# Patient Record
Sex: MALE | Race: WHITE | NOT HISPANIC OR LATINO | Employment: OTHER | URBAN - METROPOLITAN AREA
[De-identification: names, ages, dates, MRNs, and addresses within clinical notes are randomized per-mention and may not be internally consistent; named-entity substitution may affect disease eponyms.]

---

## 2017-01-09 ENCOUNTER — OFFICE VISIT (OUTPATIENT)
Dept: FAMILY MEDICINE CLINIC | Facility: CLINIC | Age: 47
End: 2017-01-09

## 2017-01-09 VITALS
HEIGHT: 77 IN | HEART RATE: 73 BPM | TEMPERATURE: 98.1 F | BODY MASS INDEX: 37.19 KG/M2 | SYSTOLIC BLOOD PRESSURE: 158 MMHG | WEIGHT: 315 LBS | DIASTOLIC BLOOD PRESSURE: 94 MMHG | OXYGEN SATURATION: 98 %

## 2017-01-09 DIAGNOSIS — J40 BRONCHITIS: Primary | ICD-10-CM

## 2017-01-09 DIAGNOSIS — J06.9 ACUTE URI: ICD-10-CM

## 2017-01-09 PROCEDURE — 99213 OFFICE O/P EST LOW 20 MIN: CPT | Performed by: FAMILY MEDICINE

## 2017-01-09 RX ORDER — CEPHALEXIN 500 MG/1
500 CAPSULE ORAL 4 TIMES DAILY
Qty: 28 CAPSULE | Refills: 0 | Status: SHIPPED | OUTPATIENT
Start: 2017-01-09 | End: 2017-01-16

## 2017-01-09 NOTE — PROGRESS NOTES
"  Chief Complaint   Patient presents with   • Sore Throat     C/o sore throat and pain while swallowing x 3 days.   • Nasal Congestion     C/o nasal congestion and drainage.   • Cough     C/o excessive cough with little production.       Upper Respiratory Infection: Patient complains of symptoms of a URI, possible sinusitis. Symptoms include congestion, cough and sore throat. Onset of symptoms was 4 days ago, gradually worsening since that time. He also c/o low grade fever, non productive cough, post nasal drip, sinus pressure, sore throat and tooth pain for the past 3 days .  He is drinking moderate amounts of fluids. Evaluation to date: none. Treatment to date: cough suppressants.  Got ill flying home.  Glass shards in throat    Vitals:    01/09/17 1457   BP: 158/94   Pulse: 73   Temp: 98.1 °F (36.7 °C)   SpO2: 98%   Weight: (!) 364 lb (165 kg)   Height: 77\" (195.6 cm)     Gen: mildly ill appearing, alert  Ears: Tm's bulging without redness  Nose:  Congestion  Throat:  Red without exudate, some drainage, tonsils okay  Neck: no LAD  Lung: mild rales, good air movement, regular RR  Heart: RR without murmur  Skin: no rash.      Assessment/Plan   Robert was seen today for sore throat, nasal congestion and cough.    Diagnoses and all orders for this visit:    Bronchitis  -     cephalexin (KEFLEX) 500 MG capsule; Take 1 capsule by mouth 4 (Four) Times a Day for 7 days.    Acute URI  -     cephalexin (KEFLEX) 500 MG capsule; Take 1 capsule by mouth 4 (Four) Times a Day for 7 days.             Tylenol or Advil as needed for pain, fever, muscle aches  Plenty of fluids  Hand washing discussed  Off work or school note given if needed.  Warm tea for throat.    Dr. Contreras Gonzales MD  [place], [place]  [facility]  "

## 2017-01-09 NOTE — MR AVS SNAPSHOT
"                        Robert Harvey   1/9/2017 3:00 PM   Office Visit    Provider:  Contreras Gonzales MD   Department:  [facility]   Dept Phone:  [phone]                Your Full Care Plan              Today's Medication Changes          These changes are accurate as of: 1/9/17  3:28 PM.  If you have any questions, ask your nurse or doctor.               New Medication(s)Ordered:     cephalexin 500 MG capsule   Commonly known as:  KEFLEX   Take 1 capsule by mouth 4 (Four) Times a Day for 7 days.   Started by:  Contreras Gonzales MD            Where to Get Your Medications      These medications were sent to [facility]/pharmacy #[id number] - [place], [place] - [address] [address] AT INTERSECTION OF [address] [phone] [facility] [phone]   [address] [address], [facility] [address]     Phone:  [phone]     cephalexin 500 MG capsule                  Your Updated Medication List          This list is accurate as of: 1/9/17  3:28 PM.  Always use your most recent med list.                CALCIUM 1200 PO       cephalexin 500 MG capsule   Commonly known as:  KEFLEX   Take 1 capsule by mouth 4 (Four) Times a Day for 7 days.       fish oil 1000 MG capsule capsule       fluticasone 50 MCG/ACT nasal spray   Commonly known as:  FLONASE   2 sprays into each nostril daily.       liothyronine 25 MCG tablet   Commonly known as:  CYTOMEL   TAKE 1 TABLET DAILY       SM VITAMIN D3 1000 UNITS tablet   Generic drug:  Cholecalciferol       SYNTHROID 137 MCG tablet   Generic drug:  levothyroxine   TAKE 1 TABLET DAILY AS     DIRECTED       Syringe/Needle (Disp) 25G X 1\" 3 ML misc   Commonly known as:  B-D 3CC LUER-LISETTE SYR 25GX1\"   1 Units every 14 (fourteen) days.       Testosterone Cypionate 200 MG/ML injection   Commonly known as:  DEPOTESTOTERONE CYPIONATE   Inject 1 mL into the shoulder, thigh, or buttocks Every 14 (Fourteen) Days.               You Were Diagnosed With        Codes Comments    Bronchitis " "   -  Primary ICD-10-CM: J40  ICD-9-CM: 490     Acute URI     ICD-10-CM: J06.9  ICD-9-CM: 465.9       Instructions     None    Patient Instructions History      MyChart Signup     [facility] [employer] allows you to send messages to your doctor, view your test results, renew your prescriptions, schedule appointments, and more. To sign up, go to [employer] and click on the Sign Up Now link in the New User? box. Enter your [employer] Activation Code exactly as it appears below along with the last four digits of your Social Security Number and your Date of Birth () to complete the sign-up process. If you do not sign up before the expiration date, you must request a new code.    [employer] Activation Code: [email or web address]  Expires: 2017  3:27 PM    If you have questions, you can email [email or web address] or call [phone] to talk to our [employer] staff. Remember, [employer] is NOT to be used for urgent needs. For medical emergencies, dial 911.               Other Info from Your Visit           Your Appointments     May 01, 2017  8:10 AM EDT   [facility] with [facility]   [facility] (--)    [address] [facility] [address]   [phone]            May 08, 2017  7:45 AM EDT   Follow Up with Contreras Gonzales MD   [facility] (--)    [address] [address]   [phone]           Arrive 15 minutes prior to appointment.              Allergies     Penicillins        Reason for Visit     Sore Throat C/o sore throat and pain while swallowing x 3 days.    Nasal Congestion C/o nasal congestion and drainage.    Cough C/o excessive cough with little production.      Vital Signs     Blood Pressure Pulse Temperature Height Weight Oxygen Saturation    158/94 73 98.1 °F (36.7 °C) 77\" (195.6 cm) 364 lb (165 kg) 98%    Body Mass Index Smoking Status                43.16 kg/m2 Former Smoker     "      Problems and Diagnoses Noted     Bronchitis    -  Primary    Acute upper respiratory infection

## 2017-01-13 RX ORDER — LEVOTHYROXINE SODIUM 137 MCG
TABLET ORAL
Qty: 90 TABLET | Refills: 3 | Status: SHIPPED | OUTPATIENT
Start: 2017-01-13 | End: 2017-05-08 | Stop reason: SINTOL

## 2017-01-13 RX ORDER — LIOTHYRONINE SODIUM 25 UG/1
TABLET ORAL
Qty: 90 TABLET | Refills: 3 | Status: SHIPPED | OUTPATIENT
Start: 2017-01-13 | End: 2017-05-08 | Stop reason: SINTOL

## 2017-02-21 ENCOUNTER — OFFICE VISIT (OUTPATIENT)
Dept: RETAIL CLINIC | Facility: CLINIC | Age: 47
End: 2017-02-21

## 2017-02-21 DIAGNOSIS — Z11.1 VISIT FOR TB SKIN TEST: Primary | ICD-10-CM

## 2017-02-21 PROCEDURE — 86580 TB INTRADERMAL TEST: CPT | Performed by: NURSE PRACTITIONER

## 2017-04-07 ENCOUNTER — RESULTS ENCOUNTER (OUTPATIENT)
Dept: FAMILY MEDICINE CLINIC | Facility: CLINIC | Age: 47
End: 2017-04-07

## 2017-04-07 DIAGNOSIS — D75.1 SECONDARY ERYTHROCYTOSIS: ICD-10-CM

## 2017-04-07 DIAGNOSIS — E03.9 ACQUIRED HYPOTHYROIDISM: ICD-10-CM

## 2017-04-07 DIAGNOSIS — Z51.81 MEDICATION MONITORING ENCOUNTER: ICD-10-CM

## 2017-04-07 DIAGNOSIS — E34.9 TESTOSTERONE DEFICIENCY: ICD-10-CM

## 2017-04-17 ENCOUNTER — OFFICE VISIT (OUTPATIENT)
Dept: FAMILY MEDICINE CLINIC | Facility: CLINIC | Age: 47
End: 2017-04-17

## 2017-04-17 ENCOUNTER — HOSPITAL ENCOUNTER (OUTPATIENT)
Dept: GENERAL RADIOLOGY | Facility: HOSPITAL | Age: 47
Discharge: HOME | End: 2017-04-17
Attending: FAMILY MEDICINE | Admitting: FAMILY MEDICINE

## 2017-04-17 VITALS
HEART RATE: 72 BPM | DIASTOLIC BLOOD PRESSURE: 90 MMHG | BODY MASS INDEX: 37.19 KG/M2 | TEMPERATURE: 98.4 F | SYSTOLIC BLOOD PRESSURE: 138 MMHG | HEIGHT: 77 IN | WEIGHT: 315 LBS | OXYGEN SATURATION: 98 %

## 2017-04-17 DIAGNOSIS — M72.2 PLANTAR FASCIITIS, RIGHT: ICD-10-CM

## 2017-04-17 DIAGNOSIS — M77.51 BONE SPUR OF RIGHT FOOT: Primary | ICD-10-CM

## 2017-04-17 DIAGNOSIS — M77.51 BONE SPUR OF RIGHT FOOT: ICD-10-CM

## 2017-04-17 PROCEDURE — 73630 X-RAY EXAM OF FOOT: CPT

## 2017-04-17 PROCEDURE — 99213 OFFICE O/P EST LOW 20 MIN: CPT | Performed by: FAMILY MEDICINE

## 2017-04-17 NOTE — PROGRESS NOTES
Subjective   Robert Harvey is a 46 y.o. male who is here for   Chief Complaint   Patient presents with   • Foot Injury     R heel x 1 week   .     History of Present Illness   Ankle Pain: Patient complains of right ankle pain.  Onset of the symptoms was a week ago. Inciting event: began walking program last week to lose weight. Current symptoms include ability to bear weight, but with some pain, stiffness and worsening symptoms after a period of inactivity.  Aggravating symptoms: going up and down stairs and squatting. Patient's overall course: gradually worsening. Patient has had prior ankle problems. Previous visits for this problem: none.  Evaluation to date: none.  Treatment to date: avoidance of offending activity and OTC analgesics which are somewhat effective.  A h/o of a heel bone spur and plantar fascitis        The following portions of the patient's history were reviewed and updated as appropriate: allergies, current medications, past medical history, past social history, past surgical history and problem list.    Review of Systems    Objective   Physical Exam   Constitutional: He appears well-developed and well-nourished.   Musculoskeletal:        Right ankle: Normal.        Right foot: There is tenderness and bony tenderness. There is no swelling.        Nursing note and vitals reviewed.      Assessment/Plan   Robert was seen today for foot injury.    Diagnoses and all orders for this visit:    Bone spur of right foot  -     XR Foot 3+ View Right; Future    Plantar fasciitis, right  -     XR Foot 3+ View Right; Future      Patient Instructions   advil  Frozen water bottle rolls      There are no discontinued medications.     Return for Next scheduled follow up.    Dr. Contreras Gonzales  [facility] [place], [place]

## 2017-05-02 LAB
CHOLEST SERPL-MCNC: 172 MG/DL (ref 0–200)
CONV COMMENT: ABNORMAL
ERYTHROCYTE [DISTWIDTH] IN BLOOD BY AUTOMATED COUNT: 13.6 % (ref 11.5–14.5)
HCT VFR BLD AUTO: 53.6 % (ref 42–52)
HDLC SERPL-MCNC: 32 MG/DL (ref 40–60)
HGB BLD-MCNC: 18 G/DL (ref 14–18)
LDLC SERPL CALC-MCNC: 97 MG/DL (ref 0–100)
MCH RBC QN AUTO: 30.8 PG (ref 27–31)
MCHC RBC AUTO-ENTMCNC: 33.6 G/DL (ref 31–37)
MCV RBC AUTO: 91.6 FL (ref 80–94)
PLATELET # BLD AUTO: 190 10*3/MM3 (ref 140–500)
PSA SERPL-MCNC: 0.33 NG/ML (ref 0–4)
RBC # BLD AUTO: 5.85 10*6/MM3 (ref 4.7–6.1)
T3 SERPL-MCNC: 114.5 NG/DL (ref 80–200)
T4 FREE SERPL-MCNC: 0.97 NG/DL (ref 0.93–1.7)
TESTOST FREE SERPL-MCNC: 11.8 PG/ML (ref 6.8–21.5)
TESTOST SERPL-MCNC: 285 NG/DL (ref 348–1197)
TRIGL SERPL-MCNC: 214 MG/DL (ref 0–150)
TSH SERPL DL<=0.005 MIU/L-ACNC: 1.64 MIU/ML (ref 0.27–4.2)
VLDLC SERPL CALC-MCNC: 42.8 MG/DL (ref 8–32)
WBC # BLD AUTO: 6.64 10*3/MM3 (ref 4.8–10.8)

## 2017-05-08 ENCOUNTER — OFFICE VISIT (OUTPATIENT)
Dept: FAMILY MEDICINE CLINIC | Facility: CLINIC | Age: 47
End: 2017-05-08

## 2017-05-08 VITALS
WEIGHT: 315 LBS | HEIGHT: 77 IN | OXYGEN SATURATION: 98 % | BODY MASS INDEX: 37.19 KG/M2 | DIASTOLIC BLOOD PRESSURE: 82 MMHG | HEART RATE: 73 BPM | TEMPERATURE: 98.1 F | SYSTOLIC BLOOD PRESSURE: 138 MMHG

## 2017-05-08 DIAGNOSIS — J30.1 SEASONAL ALLERGIC RHINITIS DUE TO POLLEN: ICD-10-CM

## 2017-05-08 DIAGNOSIS — Z51.81 MEDICATION MONITORING ENCOUNTER: ICD-10-CM

## 2017-05-08 DIAGNOSIS — E55.9 VITAMIN D DEFICIENCY: ICD-10-CM

## 2017-05-08 DIAGNOSIS — Z00.00 ROUTINE ADULT HEALTH MAINTENANCE: ICD-10-CM

## 2017-05-08 DIAGNOSIS — E03.9 ACQUIRED HYPOTHYROIDISM: Primary | ICD-10-CM

## 2017-05-08 DIAGNOSIS — E34.9 TESTOSTERONE DEFICIENCY: ICD-10-CM

## 2017-05-08 PROCEDURE — 99213 OFFICE O/P EST LOW 20 MIN: CPT | Performed by: FAMILY MEDICINE

## 2017-05-08 RX ORDER — FLUTICASONE PROPIONATE 50 MCG
1 SPRAY, SUSPENSION (ML) NASAL 2 TIMES DAILY PRN
Qty: 3 BOTTLE | Refills: 3 | Status: SHIPPED | OUTPATIENT
Start: 2017-05-08 | End: 2017-11-13 | Stop reason: SDUPTHER

## 2017-05-08 RX ORDER — TESTOSTERONE CYPIONATE 200 MG/ML
INJECTION, SOLUTION INTRAMUSCULAR
Qty: 10 ML | Refills: 1 | Status: SHIPPED | OUTPATIENT
Start: 2017-05-08 | End: 2017-07-08 | Stop reason: SDUPTHER

## 2017-05-13 ENCOUNTER — RESULTS ENCOUNTER (OUTPATIENT)
Dept: FAMILY MEDICINE CLINIC | Facility: CLINIC | Age: 47
End: 2017-05-13

## 2017-05-13 DIAGNOSIS — Z00.00 ROUTINE ADULT HEALTH MAINTENANCE: ICD-10-CM

## 2017-05-13 DIAGNOSIS — E55.9 VITAMIN D DEFICIENCY: ICD-10-CM

## 2017-05-13 DIAGNOSIS — Z51.81 MEDICATION MONITORING ENCOUNTER: ICD-10-CM

## 2017-05-13 DIAGNOSIS — E03.9 ACQUIRED HYPOTHYROIDISM: ICD-10-CM

## 2017-05-13 DIAGNOSIS — E34.9 TESTOSTERONE DEFICIENCY: ICD-10-CM

## 2017-07-08 DIAGNOSIS — E34.9 TESTOSTERONE DEFICIENCY: ICD-10-CM

## 2017-07-10 RX ORDER — TESTOSTERONE CYPIONATE 200 MG/ML
INJECTION, SOLUTION INTRAMUSCULAR
Qty: 10 ML | Refills: 3 | Status: SHIPPED | OUTPATIENT
Start: 2017-07-10 | End: 2017-10-04 | Stop reason: SDUPTHER

## 2017-07-10 RX ORDER — SYRINGE WITH NEEDLE, 1 ML 25GX5/8"
SYRINGE, EMPTY DISPOSABLE MISCELLANEOUS
Qty: 100 EACH | Refills: 2 | Status: SHIPPED | OUTPATIENT
Start: 2017-07-10 | End: 2019-02-23 | Stop reason: SDUPTHER

## 2017-07-10 NOTE — TELEPHONE ENCOUNTER
Last OV: 05/08/2017  Next OV: 11/13/2017  Last RF: 05/08/2017  #10 with 1 rf   Elliot done today

## 2017-10-04 DIAGNOSIS — E34.9 TESTOSTERONE DEFICIENCY: ICD-10-CM

## 2017-10-05 RX ORDER — TESTOSTERONE CYPIONATE 200 MG/ML
INJECTION, SOLUTION INTRAMUSCULAR
Qty: 10 ML | Refills: 0 | OUTPATIENT
Start: 2017-10-05 | End: 2017-11-13 | Stop reason: SDUPTHER

## 2017-10-05 NOTE — TELEPHONE ENCOUNTER
Last OV: 05/08/2017  Next OV: 11/13/2017  Last RF: 07/10/2017  # 10        3rfs  Elliot: 07/10/2017

## 2017-11-06 DIAGNOSIS — E03.9 ACQUIRED HYPOTHYROIDISM: ICD-10-CM

## 2017-11-08 LAB
25(OH)D3+25(OH)D2 SERPL-MCNC: 23.6 NG/ML
ALBUMIN SERPL-MCNC: 4.6 G/DL (ref 3.5–5.2)
ALBUMIN/GLOB SERPL: 2.3 G/DL
ALP SERPL-CCNC: 72 U/L (ref 40–129)
ALT SERPL-CCNC: 36 U/L (ref 5–41)
AST SERPL-CCNC: 27 U/L (ref 5–40)
BILIRUB SERPL-MCNC: 0.7 MG/DL (ref 0.2–1.2)
BUN SERPL-MCNC: 16 MG/DL (ref 6–20)
BUN/CREAT SERPL: 14.8 (ref 7–25)
CALCIUM SERPL-MCNC: 9.5 MG/DL (ref 8.6–10.5)
CHLORIDE SERPL-SCNC: 105 MMOL/L (ref 98–107)
CO2 SERPL-SCNC: 26.5 MMOL/L (ref 22–29)
CREAT SERPL-MCNC: 1.08 MG/DL (ref 0.76–1.27)
ERYTHROCYTE [DISTWIDTH] IN BLOOD BY AUTOMATED COUNT: 13.7 % (ref 11.5–14.5)
GFR SERPLBLD CREATININE-BSD FMLA CKD-EPI: 73 ML/MIN/1.73
GFR SERPLBLD CREATININE-BSD FMLA CKD-EPI: 88 ML/MIN/1.73
GLOBULIN SER CALC-MCNC: 2 GM/DL
GLUCOSE SERPL-MCNC: 90 MG/DL (ref 65–99)
HCT VFR BLD AUTO: 44.1 % (ref 42–52)
HGB BLD-MCNC: 15 G/DL (ref 14–18)
MCH RBC QN AUTO: 32 PG (ref 27–31)
MCHC RBC AUTO-ENTMCNC: 34 G/DL (ref 31–37)
MCV RBC AUTO: 94 FL (ref 80–94)
PLATELET # BLD AUTO: 181 10*3/MM3 (ref 140–500)
POTASSIUM SERPL-SCNC: 4.7 MMOL/L (ref 3.5–5.2)
PROT SERPL-MCNC: 6.6 G/DL (ref 6–8.5)
PSA SERPL-MCNC: 0.35 NG/ML (ref 0–4)
RBC # BLD AUTO: 4.69 10*6/MM3 (ref 4.7–6.1)
SODIUM SERPL-SCNC: 144 MMOL/L (ref 136–145)
T3 SERPL-MCNC: 175.3 NG/DL (ref 80–200)
T4 FREE SERPL-MCNC: 1.01 NG/DL (ref 0.93–1.7)
TESTOST FREE SERPL-MCNC: 8.7 PG/ML (ref 6.8–21.5)
TESTOST SERPL-MCNC: 309 NG/DL (ref 264–916)
TSH SERPL DL<=0.005 MIU/L-ACNC: 4.77 MIU/ML (ref 0.27–4.2)
WBC # BLD AUTO: 5.46 10*3/MM3 (ref 4.8–10.8)

## 2017-11-13 ENCOUNTER — OFFICE VISIT (OUTPATIENT)
Dept: FAMILY MEDICINE CLINIC | Facility: CLINIC | Age: 47
End: 2017-11-13

## 2017-11-13 VITALS
HEIGHT: 77 IN | TEMPERATURE: 98 F | SYSTOLIC BLOOD PRESSURE: 144 MMHG | OXYGEN SATURATION: 97 % | HEART RATE: 72 BPM | DIASTOLIC BLOOD PRESSURE: 90 MMHG | WEIGHT: 315 LBS | BODY MASS INDEX: 37.19 KG/M2

## 2017-11-13 DIAGNOSIS — E03.9 ACQUIRED HYPOTHYROIDISM: Primary | ICD-10-CM

## 2017-11-13 DIAGNOSIS — E34.9 TESTOSTERONE DEFICIENCY: ICD-10-CM

## 2017-11-13 DIAGNOSIS — J30.1 CHRONIC SEASONAL ALLERGIC RHINITIS DUE TO POLLEN: ICD-10-CM

## 2017-11-13 DIAGNOSIS — Z00.00 ROUTINE ADULT HEALTH MAINTENANCE: ICD-10-CM

## 2017-11-13 DIAGNOSIS — Z12.5 SCREENING FOR PROSTATE CANCER: ICD-10-CM

## 2017-11-13 PROCEDURE — 99396 PREV VISIT EST AGE 40-64: CPT | Performed by: FAMILY MEDICINE

## 2017-11-13 RX ORDER — FLUTICASONE PROPIONATE 50 MCG
1 SPRAY, SUSPENSION (ML) NASAL 2 TIMES DAILY PRN
Qty: 3 BOTTLE | Refills: 3 | Status: SHIPPED | OUTPATIENT
Start: 2017-11-13 | End: 2018-05-14 | Stop reason: SDUPTHER

## 2017-11-13 RX ORDER — TESTOSTERONE CYPIONATE 200 MG/ML
200 INJECTION, SOLUTION INTRAMUSCULAR SEE ADMIN INSTRUCTIONS
Qty: 10 ML | Refills: 1 | Status: SHIPPED | OUTPATIENT
Start: 2017-11-13 | End: 2018-02-08 | Stop reason: SDUPTHER

## 2017-11-13 NOTE — PROGRESS NOTES
"  Chief Complaint   Patient presents with   • Annual Exam   • Toe Numbness   • Hip Pain     Left, x 1 1/2 weeks        Subjective   Robert Harvey is a 47 y.o. male and is here for a yearly physical exam. The patient reports problems - allergies, weight, left hip is sore..  The bottoms of his 2-4 toes on both sides are still numb.  Rest of foot and leg are ok. Fingers ok  Do you take any herbs or supplements that were not prescribed by a doctor? yes. If so, these will be added to active medication list.    The following portions of the patient's history were reviewed and updated as appropriate: allergies, current medications, past family history, past medical history, past social history, past surgical history and problem list.    Social and Family and Surgical History reviewed and updated today, see Rooming tab.    Health History, Preventive Measures and Vaccination flow sheets reviewed and updated today.    Patient's current medical chart in Epic; including previous office notes, imaging, labs, specialist's evaluation either in notes or in Media tab reviewed today.    Other pertinent medical information also reviewed thru Care Everywhere function is also reviewed today.    Review of Systems  Review of Systems  A comprehensive review of systems was negative except for: Constitutional: positive for fatigue  Ears, nose, mouth, throat, and face: positive for nasal congestion  Musculoskeletal: positive for arthralgias  Allergic/Immunologic: positive for hay fever    Vitals:    11/13/17 0751   BP: 144/90   BP Location: Left arm   Patient Position: Sitting   Pulse: 72   Temp: 98 °F (36.7 °C)   SpO2: 97%   Weight: (!) 356 lb 12.8 oz (162 kg)   Height: 77\" (195.6 cm)       General Appearance:  Alert, cooperative, no distress, appears stated age   Head:  Normocephalic, without obvious abnormality, atraumatic   Eyes:  PERRL, conjunctiva/corneas clear, EOM's intact.   Ears:  Normal TM's and external ear canals, both ears "   Nose: Nares normal, septum midline, mucosa normal, no drainage or sinus tenderness   Throat: Lips, mucosa, and tongue normal; teeth and gums normal   Neck: Supple, symmetrical, trachea midline, no adenopathy;   thyroid: No enlargement/tenderness/nodules; no carotid  bruit   Back:  Symmetric, no curvature, ROM normal, no CVA tenderness   Lungs:  Clear to auscultation bilaterally, respirations unlabored   Chest wall:  No tenderness or deformity   Heart:  Regular rate and rhythm, S1 and S2 normal, no murmur, rub or gallop   Abdomen:  Soft, non-tender, bowel sounds active all four quadrants,   no masses, no organomegaly   Rectal:        Extremities: Extremities normal, atraumatic, no cyanosis or edema   Pulses: 2+ and symmetric all extremities   Skin: Skin color, texture, turgor normal, no rashes or lesions   Lymph nodes: Cervical, supraclavicular, and axillary nodes normal   Neurologic: CNII-XII intact. Normal strength, sensation and reflexes   throughout          Results for orders placed or performed in visit on 11/06/17   Comprehensive Metabolic Panel   Result Value Ref Range    Glucose 90 65 - 99 mg/dL    BUN 16 6 - 20 mg/dL    Creatinine 1.08 0.76 - 1.27 mg/dL    eGFR Non African Am 73 >60 mL/min/1.73    eGFR African Am 88 >60 mL/min/1.73    BUN/Creatinine Ratio 14.8 7.0 - 25.0    Sodium 144 136 - 145 mmol/L    Potassium 4.7 3.5 - 5.2 mmol/L    Chloride 105 98 - 107 mmol/L    Total CO2 26.5 22.0 - 29.0 mmol/L    Calcium 9.5 8.6 - 10.5 mg/dL    Total Protein 6.6 6.0 - 8.5 g/dL    Albumin 4.60 3.50 - 5.20 g/dL    Globulin 2.0 gm/dL    A/G Ratio 2.3 g/dL    Total Bilirubin 0.7 0.2 - 1.2 mg/dL    Alkaline Phosphatase 72 40 - 129 U/L    AST (SGOT) 27 5 - 40 U/L    ALT (SGPT) 36 5 - 41 U/L   CBC (No Diff)   Result Value Ref Range    WBC 5.46 4.80 - 10.80 10*3/mm3    RBC 4.69 (L) 4.70 - 6.10 10*6/mm3    Hemoglobin 15.0 14.0 - 18.0 g/dL    Hematocrit 44.1 42.0 - 52.0 %    MCV 94.0 80.0 - 94.0 fL    MCH 32.0 (H) 27.0 -  31.0 pg    MCHC 34.0 31.0 - 37.0 g/dL    RDW 13.7 11.5 - 14.5 %    Platelets 181 140 - 500 10*3/mm3   Testosterone, Free, Total   Result Value Ref Range    Testosterone, Total 309 264 - 916 ng/dL    Testosterone, Free 8.7 6.8 - 21.5 pg/mL   T4, Free   Result Value Ref Range    Free T4 1.01 0.93 - 1.70 ng/dL   TSH   Result Value Ref Range    TSH 4.770 (H) 0.270 - 4.200 mIU/mL   PSA   Result Value Ref Range    PSA 0.349 0.000 - 4.000 ng/mL   Vitamin D 25 Hydroxy   Result Value Ref Range    25 Hydroxy, Vitamin D 23.6 ng/mL   T3   Result Value Ref Range    T3, Total 175.3 80.0 - 200.0 ng/dL     Assessment/Plan   Healthy male exam.  Robert was seen today for annual exam, toe numbness and hip pain.    Diagnoses and all orders for this visit:    Acquired hypothyroidism  -     Thyroid 81.25 MG PO tablet; Take 1 tablet by mouth Daily.  -     Lipid Panel; Future  -     TSH; Future  -     T4, Free; Future  -     T3; Future    Routine adult health maintenance    Testosterone deficiency  -     Testosterone Cypionate (DEPOTESTOTERONE CYPIONATE) 200 MG/ML injection; Inject 1 mL into the shoulder, thigh, or buttocks See Admin Instructions.  -     Testosterone, Free, Total; Future  -     Lipid Panel; Future    Chronic seasonal allergic rhinitis due to pollen  -     fluticasone (FLONASE) 50 MCG/ACT nasal spray; 1 spray into each nostril 2 (Two) Times a Day As Needed for Rhinitis or Allergies.    Screening for prostate cancer  -     PSA; Future      1. Thyroid a little under treated on his current Nature thyroid, lets increase to next dose up  Stay on same T dose.    2. Patient Counseling:  --Nutrition: Stressed importance of moderation in sodium/caffeine intake, saturated fat and cholesterol.  Discussed caloric balance, sufficient intake of fresh fruits, vegetables, fiber, calcium, iron.lifelong struggle with weight  --Discussed the daily use of baby aspirin, if indicated.not yet  --Exercise: Stressed the importance of regular  exercise. Needs more  --Substance Abuse: Discussed cessation/primary prevention of tobacco, alcohol, or other drug use; driving or other dangerous activities under the influence. Good here   --Dental health: Discussed importance of regular tooth brushing, flossing, and dental visits.utd  -- suggested having eyes and vision checked if needed or past due.utd  --Immunizations reviewed.  --Discussed benefits of screening colonoscopy.first will be age 49  3. Discussed the patient's BMI with him.  The BMI is above average; BMI management plan is completed  4. Follow up in 6 months    There are no Patient Instructions on file for this visit.    Medications Discontinued During This Encounter   Medication Reason   • Calcium Carbonate-Vit D-Min (CALCIUM 1200 PO) Therapy completed   • Thyroid 130 MG PO tablet Dose adjustment   • fluticasone (FLONASE) 50 MCG/ACT nasal spray Reorder   • NATURE-THROID 65 MG tablet Dose adjustment   • Testosterone Cypionate (DEPOTESTOTERONE CYPIONATE) 200 MG/ML injection Reorder        Dr. Contreras Gonzales MD  [place], [place]  [facility]

## 2018-02-08 DIAGNOSIS — E34.9 TESTOSTERONE DEFICIENCY: ICD-10-CM

## 2018-02-09 RX ORDER — TESTOSTERONE CYPIONATE 200 MG/ML
INJECTION, SOLUTION INTRAMUSCULAR
Qty: 3 ML | Refills: 5 | OUTPATIENT
Start: 2018-02-09 | End: 2018-04-11 | Stop reason: SDUPTHER

## 2018-02-09 NOTE — TELEPHONE ENCOUNTER
Last OV: 11/13/2017  Next OV: 05/14/2018  Last RF: 11/13/2017  # 10        1rfs  Elliot: 07/10/2017

## 2018-02-17 DIAGNOSIS — E34.9 TESTOSTERONE DEFICIENCY: ICD-10-CM

## 2018-02-19 RX ORDER — TESTOSTERONE CYPIONATE 200 MG/ML
INJECTION, SOLUTION INTRAMUSCULAR
Qty: 10 ML | Refills: 0 | OUTPATIENT
Start: 2018-02-19

## 2018-02-21 ENCOUNTER — PRIOR AUTHORIZATION (OUTPATIENT)
Dept: FAMILY MEDICINE CLINIC | Facility: CLINIC | Age: 48
End: 2018-02-21

## 2018-04-11 DIAGNOSIS — E34.9 TESTOSTERONE DEFICIENCY: ICD-10-CM

## 2018-04-11 RX ORDER — TESTOSTERONE CYPIONATE 200 MG/ML
INJECTION, SOLUTION INTRAMUSCULAR
Qty: 10 ML | Refills: 1 | OUTPATIENT
Start: 2018-04-11 | End: 2018-05-14 | Stop reason: SDUPTHER

## 2018-04-13 ENCOUNTER — RESULTS ENCOUNTER (OUTPATIENT)
Dept: FAMILY MEDICINE CLINIC | Facility: CLINIC | Age: 48
End: 2018-04-13

## 2018-04-13 DIAGNOSIS — E03.9 ACQUIRED HYPOTHYROIDISM: ICD-10-CM

## 2018-04-13 DIAGNOSIS — E34.9 TESTOSTERONE DEFICIENCY: ICD-10-CM

## 2018-04-13 DIAGNOSIS — Z12.5 SCREENING FOR PROSTATE CANCER: ICD-10-CM

## 2018-05-04 LAB
CHOLEST SERPL-MCNC: 190 MG/DL (ref 0–200)
HDLC SERPL-MCNC: 36 MG/DL (ref 40–60)
LDLC SERPL CALC-MCNC: 123 MG/DL (ref 0–100)
PSA SERPL-MCNC: 0.28 NG/ML (ref 0–4)
T3 SERPL-MCNC: 139.4 NG/DL (ref 80–200)
T4 FREE SERPL-MCNC: 1.01 NG/DL (ref 0.93–1.7)
TESTOST FREE SERPL-MCNC: 8.4 PG/ML (ref 6.8–21.5)
TESTOST SERPL-MCNC: 172 NG/DL (ref 264–916)
TRIGL SERPL-MCNC: 153 MG/DL (ref 0–150)
TSH SERPL DL<=0.005 MIU/L-ACNC: 4.03 MIU/ML (ref 0.27–4.2)
VLDLC SERPL CALC-MCNC: 30.6 MG/DL (ref 8–32)

## 2018-05-14 ENCOUNTER — OFFICE VISIT (OUTPATIENT)
Dept: FAMILY MEDICINE CLINIC | Facility: CLINIC | Age: 48
End: 2018-05-14

## 2018-05-14 VITALS
HEIGHT: 77 IN | SYSTOLIC BLOOD PRESSURE: 134 MMHG | WEIGHT: 315 LBS | HEART RATE: 67 BPM | DIASTOLIC BLOOD PRESSURE: 80 MMHG | TEMPERATURE: 98 F | OXYGEN SATURATION: 97 % | BODY MASS INDEX: 37.19 KG/M2

## 2018-05-14 DIAGNOSIS — E55.9 VITAMIN D DEFICIENCY: ICD-10-CM

## 2018-05-14 DIAGNOSIS — Z00.00 ROUTINE ADULT HEALTH MAINTENANCE: ICD-10-CM

## 2018-05-14 DIAGNOSIS — E03.9 ACQUIRED HYPOTHYROIDISM: Primary | ICD-10-CM

## 2018-05-14 DIAGNOSIS — Z51.81 MEDICATION MONITORING ENCOUNTER: ICD-10-CM

## 2018-05-14 DIAGNOSIS — Z12.5 SCREENING FOR PROSTATE CANCER: ICD-10-CM

## 2018-05-14 DIAGNOSIS — D75.1 SECONDARY ERYTHROCYTOSIS: ICD-10-CM

## 2018-05-14 DIAGNOSIS — J30.1 CHRONIC SEASONAL ALLERGIC RHINITIS DUE TO POLLEN: ICD-10-CM

## 2018-05-14 DIAGNOSIS — E34.9 TESTOSTERONE DEFICIENCY: ICD-10-CM

## 2018-05-14 PROCEDURE — 99213 OFFICE O/P EST LOW 20 MIN: CPT | Performed by: FAMILY MEDICINE

## 2018-05-14 RX ORDER — TESTOSTERONE CYPIONATE 200 MG/ML
INJECTION, SOLUTION INTRAMUSCULAR
Qty: 3 ML | Refills: 5 | Status: SHIPPED | OUTPATIENT
Start: 2018-05-14 | End: 2018-11-26 | Stop reason: SDUPTHER

## 2018-05-14 RX ORDER — FLUTICASONE PROPIONATE 50 MCG
1 SPRAY, SUSPENSION (ML) NASAL 2 TIMES DAILY PRN
Qty: 3 BOTTLE | Refills: 3 | Status: SHIPPED | OUTPATIENT
Start: 2018-05-14 | End: 2019-05-28 | Stop reason: SDUPTHER

## 2018-05-14 NOTE — PATIENT INSTRUCTIONS
Results for orders placed or performed in visit on 04/13/18   Testosterone, Free, Total   Result Value Ref Range    Testosterone, Total 172 (L) 264 - 916 ng/dL    Testosterone, Free 8.4 6.8 - 21.5 pg/mL   PSA   Result Value Ref Range    PSA 0.284 0.000 - 4.000 ng/mL   Lipid Panel   Result Value Ref Range    Total Cholesterol 190 0 - 200 mg/dL    Triglycerides 153 (H) 0 - 150 mg/dL    HDL Cholesterol 36 (L) 40 - 60 mg/dL    VLDL Cholesterol 30.6 8 - 32 mg/dL    LDL Cholesterol  123 (H) 0 - 100 mg/dL   TSH   Result Value Ref Range    TSH 4.030 0.270 - 4.200 mIU/mL   T4, Free   Result Value Ref Range    Free T4 1.01 0.93 - 1.70 ng/dL   T3   Result Value Ref Range    T3, Total 139.4 80.0 - 200.0 ng/dl

## 2018-05-14 NOTE — PROGRESS NOTES
Subjective   Robert Harvey is a 47 y.o. male who is here for   Chief Complaint   Patient presents with   • Follow-up     Lab Results    • Hypothyroidism   .     History of Present Illness   Hypothyroidism: Patient presents for evaluation of thyroid function. Symptoms consist of denies fatigue, weight changes, heat/cold intolerance, bowel/skin changes or CVS symptoms. Symptoms have present for several years. The symptoms are mild.  The problem has been completely resolved.  Previous thyroid studies include TSH. The hypothyroidism is due to hypothyroidism.    Reviewed labs,     T is low, but he feels ok, so we will stay on same dose        The following portions of the patient's history were reviewed and updated as appropriate: allergies, current medications, past family history, past medical history, past social history and problem list.    Review of Systems    Objective   Physical Exam   Constitutional: He appears well-developed and well-nourished.   Neck: No thyromegaly present.   Cardiovascular: Normal rate.    Pulmonary/Chest: Effort normal.   Neurological: He is alert.   Psychiatric: He has a normal mood and affect.   Nursing note and vitals reviewed.      Assessment/Plan   Robert was seen today for follow-up and hypothyroidism.    Diagnoses and all orders for this visit:    Acquired hypothyroidism  -     Lipid Panel; Future  -     TSH; Future  -     T4, Free; Future    Testosterone deficiency  -     Testosterone Cypionate (DEPOTESTOTERONE CYPIONATE) 200 MG/ML injection; Inject 1 ml every 10 days  -     Testosterone, Free, Total; Future    Chronic seasonal allergic rhinitis due to pollen  -     fluticasone (FLONASE) 50 MCG/ACT nasal spray; 1 spray into each nostril 2 (Two) Times a Day As Needed for Rhinitis or Allergies.    Medication monitoring encounter    Routine adult health maintenance  -     Comprehensive Metabolic Panel; Future  -     Urinalysis With / Microscopic If Indicated - Urine, Clean Catch;  Future    Screening for prostate cancer  -     PSA Screen; Future    Secondary erythrocytosis  -     CBC (No Diff); Future    Vitamin D deficiency  -     Vitamin D 25 Hydroxy; Future    Other orders  -     NATURE-THROID 65 MG tablet; Take 1 tablet by mouth Daily.      Patient Instructions     Results for orders placed or performed in visit on 04/13/18   Testosterone, Free, Total   Result Value Ref Range    Testosterone, Total 172 (L) 264 - 916 ng/dL    Testosterone, Free 8.4 6.8 - 21.5 pg/mL   PSA   Result Value Ref Range    PSA 0.284 0.000 - 4.000 ng/mL   Lipid Panel   Result Value Ref Range    Total Cholesterol 190 0 - 200 mg/dL    Triglycerides 153 (H) 0 - 150 mg/dL    HDL Cholesterol 36 (L) 40 - 60 mg/dL    VLDL Cholesterol 30.6 8 - 32 mg/dL    LDL Cholesterol  123 (H) 0 - 100 mg/dL   TSH   Result Value Ref Range    TSH 4.030 0.270 - 4.200 mIU/mL   T4, Free   Result Value Ref Range    Free T4 1.01 0.93 - 1.70 ng/dL   T3   Result Value Ref Range    T3, Total 139.4 80.0 - 200.0 ng/dl           Medications Discontinued During This Encounter   Medication Reason   • Thyroid 81.25 MG PO tablet Dose adjustment   • Testosterone Cypionate (DEPOTESTOTERONE CYPIONATE) 200 MG/ML injection Reorder   • NATURE-THROID 65 MG tablet Reorder   • fluticasone (FLONASE) 50 MCG/ACT nasal spray Reorder        Return in about 6 months (around 11/14/2018) for Annual physical.    Dr. Contreras Gonzales  [place], [place]

## 2018-06-21 ENCOUNTER — TELEPHONE (OUTPATIENT)
Dept: FAMILY MEDICINE CLINIC | Facility: CLINIC | Age: 48
End: 2018-06-21

## 2018-06-21 DIAGNOSIS — Z11.1 VISIT FOR TB SKIN TEST: ICD-10-CM

## 2018-06-21 DIAGNOSIS — Z00.00 ROUTINE ADULT HEALTH MAINTENANCE: ICD-10-CM

## 2018-06-21 DIAGNOSIS — Z11.1 VISIT FOR TB SKIN TEST: Primary | ICD-10-CM

## 2018-06-21 NOTE — TELEPHONE ENCOUNTER
Pt is coming in tomorrow for labs. For chicken pox and TB titers. (this is what it states on the schedule)     Ok labs ordered  Contreras Gonzales MD

## 2018-06-22 DIAGNOSIS — Z00.00 ROUTINE HEALTH MAINTENANCE: ICD-10-CM

## 2018-06-28 LAB
ANNOTATION COMMENT IMP: NORMAL
GAMMA INTERFERON BACKGROUND BLD IA-ACNC: 0.05 IU/ML
M TB IFN-G BLD-IMP: NEGATIVE
M TB IFN-G CD4+ BCKGRND COR BLD-ACNC: <0 IU/ML
M TB IFN-G CD4+ T-CELLS BLD-ACNC: 0.04 IU/ML
MEV IGG SER IA-ACNC: >300 AU/ML
MITOGEN IGNF BLD-ACNC: >10 IU/ML
MUV IGG SER IA-ACNC: 46.9 AU/ML
QUANTIFERON INCUBATION: NORMAL
RUBV IGG SERPL IA-ACNC: 6.83 INDEX
SERVICE CMNT-IMP: NORMAL
VZV IGG SER IA-ACNC: 3044 INDEX
VZV IGM SER IA-ACNC: <0.91 INDEX (ref 0–0.9)

## 2018-10-14 ENCOUNTER — RESULTS ENCOUNTER (OUTPATIENT)
Dept: FAMILY MEDICINE CLINIC | Facility: CLINIC | Age: 48
End: 2018-10-14

## 2018-10-14 DIAGNOSIS — Z00.00 ROUTINE ADULT HEALTH MAINTENANCE: ICD-10-CM

## 2018-10-14 DIAGNOSIS — E03.9 ACQUIRED HYPOTHYROIDISM: ICD-10-CM

## 2018-10-14 DIAGNOSIS — E34.9 TESTOSTERONE DEFICIENCY: ICD-10-CM

## 2018-10-14 DIAGNOSIS — Z12.5 SCREENING FOR PROSTATE CANCER: ICD-10-CM

## 2018-10-14 DIAGNOSIS — D75.1 SECONDARY ERYTHROCYTOSIS: ICD-10-CM

## 2018-10-14 DIAGNOSIS — E55.9 VITAMIN D DEFICIENCY: ICD-10-CM

## 2018-11-14 LAB
25(OH)D3+25(OH)D2 SERPL-MCNC: 28.4 NG/ML
ALBUMIN SERPL-MCNC: 4.7 G/DL (ref 3.5–5.2)
ALBUMIN/GLOB SERPL: 2 G/DL
ALP SERPL-CCNC: 77 U/L (ref 40–129)
ALT SERPL-CCNC: 48 U/L (ref 5–41)
APPEARANCE UR: CLEAR
AST SERPL-CCNC: 36 U/L (ref 5–40)
BILIRUB SERPL-MCNC: 0.7 MG/DL (ref 0.2–1.2)
BILIRUB UR QL STRIP: NEGATIVE
BUN SERPL-MCNC: 19 MG/DL (ref 6–20)
BUN/CREAT SERPL: 17.6 (ref 7–25)
CALCIUM SERPL-MCNC: 9.4 MG/DL (ref 8.6–10.5)
CHLORIDE SERPL-SCNC: 102 MMOL/L (ref 98–107)
CHOLEST SERPL-MCNC: 181 MG/DL (ref 0–200)
CO2 SERPL-SCNC: 26.4 MMOL/L (ref 22–29)
COLOR UR: YELLOW
CREAT SERPL-MCNC: 1.08 MG/DL (ref 0.76–1.27)
ERYTHROCYTE [DISTWIDTH] IN BLOOD BY AUTOMATED COUNT: 13.1 % (ref 11.5–14.5)
GLOBULIN SER CALC-MCNC: 2.4 GM/DL
GLUCOSE SERPL-MCNC: 100 MG/DL (ref 65–99)
GLUCOSE UR QL: NEGATIVE
HCT VFR BLD AUTO: 47.6 % (ref 42–52)
HDLC SERPL-MCNC: 39 MG/DL (ref 40–60)
HGB BLD-MCNC: 15.9 G/DL (ref 14–18)
HGB UR QL STRIP: NEGATIVE
KETONES UR QL STRIP: NEGATIVE
LDLC SERPL CALC-MCNC: 105 MG/DL (ref 0–100)
LEUKOCYTE ESTERASE UR QL STRIP: NEGATIVE
MCH RBC QN AUTO: 31.7 PG (ref 27–31)
MCHC RBC AUTO-ENTMCNC: 33.4 G/DL (ref 31–37)
MCV RBC AUTO: 94.8 FL (ref 80–94)
NITRITE UR QL STRIP: NEGATIVE
PH UR STRIP: 6.5 [PH] (ref 4.5–8)
PLATELET # BLD AUTO: 215 10*3/MM3 (ref 140–500)
POTASSIUM SERPL-SCNC: 4.6 MMOL/L (ref 3.5–5.2)
PROT SERPL-MCNC: 7.1 G/DL (ref 6–8.5)
PROT UR QL STRIP: NEGATIVE
PSA SERPL-MCNC: 0.32 NG/ML (ref 0–4)
RBC # BLD AUTO: 5.02 10*6/MM3 (ref 4.7–6.1)
SODIUM SERPL-SCNC: 140 MMOL/L (ref 136–145)
SP GR UR: 1.02 (ref 1–1.03)
T4 FREE SERPL-MCNC: 1.05 NG/DL (ref 0.93–1.7)
TESTOST FREE SERPL-MCNC: 11.3 PG/ML (ref 6.8–21.5)
TESTOST SERPL-MCNC: 152 NG/DL (ref 264–916)
TRIGL SERPL-MCNC: 186 MG/DL (ref 0–150)
TSH SERPL DL<=0.005 MIU/L-ACNC: 7.42 MIU/ML (ref 0.27–4.2)
UROBILINOGEN UR STRIP-MCNC: NORMAL MG/DL
VLDLC SERPL CALC-MCNC: 37.2 MG/DL (ref 8–32)
WBC # BLD AUTO: 6.59 10*3/MM3 (ref 4.8–10.8)

## 2018-11-19 ENCOUNTER — TELEPHONE (OUTPATIENT)
Dept: FAMILY MEDICINE CLINIC | Facility: CLINIC | Age: 48
End: 2018-11-19

## 2018-11-19 NOTE — TELEPHONE ENCOUNTER
Pt called back about his lab results and asked if you are going to change his dose that you go ahead and do this. He said he is due for a refill and does not want to have to get another RX soon.       Ok i sent in nature thyroid at 81.25  Into pharm    Contreras Gonzales MD

## 2018-11-26 ENCOUNTER — OFFICE VISIT (OUTPATIENT)
Dept: FAMILY MEDICINE CLINIC | Facility: CLINIC | Age: 48
End: 2018-11-26

## 2018-11-26 VITALS
OXYGEN SATURATION: 96 % | SYSTOLIC BLOOD PRESSURE: 154 MMHG | BODY MASS INDEX: 37.19 KG/M2 | HEIGHT: 77 IN | DIASTOLIC BLOOD PRESSURE: 90 MMHG | WEIGHT: 315 LBS | HEART RATE: 78 BPM

## 2018-11-26 DIAGNOSIS — Z51.81 MEDICATION MONITORING ENCOUNTER: ICD-10-CM

## 2018-11-26 DIAGNOSIS — Z12.5 SCREENING FOR PROSTATE CANCER: ICD-10-CM

## 2018-11-26 DIAGNOSIS — G89.29 CHRONIC PAIN OF BOTH KNEES: ICD-10-CM

## 2018-11-26 DIAGNOSIS — M25.561 CHRONIC PAIN OF BOTH KNEES: ICD-10-CM

## 2018-11-26 DIAGNOSIS — E34.9 TESTOSTERONE DEFICIENCY: ICD-10-CM

## 2018-11-26 DIAGNOSIS — E03.9 ACQUIRED HYPOTHYROIDISM: Primary | ICD-10-CM

## 2018-11-26 DIAGNOSIS — D75.1 SECONDARY ERYTHROCYTOSIS: ICD-10-CM

## 2018-11-26 DIAGNOSIS — M25.562 CHRONIC PAIN OF BOTH KNEES: ICD-10-CM

## 2018-11-26 PROCEDURE — 99214 OFFICE O/P EST MOD 30 MIN: CPT | Performed by: FAMILY MEDICINE

## 2018-11-26 RX ORDER — THYROID,PORK 90 MG
TABLET ORAL
COMMUNITY
Start: 2018-11-23 | End: 2019-05-28

## 2018-11-26 RX ORDER — TESTOSTERONE CYPIONATE 200 MG/ML
200 INJECTION, SOLUTION INTRAMUSCULAR
Qty: 4 ML | Refills: 5 | Status: SHIPPED | OUTPATIENT
Start: 2018-11-26 | End: 2019-05-23 | Stop reason: SDUPTHER

## 2018-11-26 NOTE — PROGRESS NOTES
Subjective   Robert Harvey is a 48 y.o. male who is here for   Chief Complaint   Patient presents with   • Follow-up   • Hypothyroidism   • Fatigue   • Knee Pain   .     History of Present Illness   Hypothyroidism: Patient presents for evaluation of thyroid function. Symptoms consist of fatigue, weight gain, depression. Symptoms have present for several months. The symptoms are mild.  The problem has been unchanged.  Previous thyroid studies include TSH and total T4. The hypothyroidism is due to post surgical.    His mother  of a stroke this yr  Flying to [place] to care for her and father    Knee pain is getting worse  Would like a referral to ortho  I suggested sports med clinic.    We could not get Nature-thyroid in the dose he needed  So we changed over to [place] 90    The following portions of the patient's history were reviewed and updated as appropriate: allergies, current medications, past family history, past medical history, past social history, past surgical history and problem list.    Review of Systems    Objective   Physical Exam   Constitutional: He appears well-developed and well-nourished.   Neck: No thyromegaly present.   Cardiovascular: Normal rate.   Pulmonary/Chest: Effort normal.   Abdominal: Soft.   Musculoskeletal:        Left knee: He exhibits decreased range of motion. Tenderness found.   Nursing note and vitals reviewed.      Assessment/Plan   Robert was seen today for follow-up, hypothyroidism, fatigue and knee pain.    Diagnoses and all orders for this visit:    Acquired hypothyroidism  -     TSH; Future  -     T4, Free; Future  -     T3; Future    Medication monitoring encounter    Secondary erythrocytosis    Testosterone deficiency  -     Testosterone Cypionate (DEPOTESTOTERONE CYPIONATE) 200 MG/ML injection; Inject 1 mL into the appropriate muscle as directed by prescriber Every 7 (Seven) Days. Inject 1 ml every 10 days  -     Testosterone, Free, Total; Future    Chronic pain of  both knees  -     XR Knee 3 View Bilateral; Future  -     Ambulatory Referral to Sports Medicine    Screening for prostate cancer  -     PSA Screen; Future      Adjusted [place] thyroid  T level is too low as well, will increase from q10 d to q 7d    There are no Patient Instructions on file for this visit.    Medications Discontinued During This Encounter   Medication Reason   • Thyroid 81.25 MG PO tablet Dose adjustment   • Testosterone Cypionate (DEPOTESTOTERONE CYPIONATE) 200 MG/ML injection Reorder        Return in about 6 months (around 5/26/2019) for new medication follow up.    Dr. Contreras Gonzales  [facility] [place], [place]

## 2019-01-07 ENCOUNTER — OFFICE VISIT (OUTPATIENT)
Dept: SPORTS MEDICINE | Facility: CLINIC | Age: 49
End: 2019-01-07

## 2019-01-07 VITALS
TEMPERATURE: 97.8 F | DIASTOLIC BLOOD PRESSURE: 86 MMHG | OXYGEN SATURATION: 97 % | HEART RATE: 83 BPM | HEIGHT: 77 IN | SYSTOLIC BLOOD PRESSURE: 164 MMHG | BODY MASS INDEX: 34.83 KG/M2 | WEIGHT: 295 LBS

## 2019-01-07 DIAGNOSIS — M17.11 ARTHRITIS OF RIGHT KNEE: Primary | ICD-10-CM

## 2019-01-07 DIAGNOSIS — M17.10 PATELLOFEMORAL ARTHRITIS: ICD-10-CM

## 2019-01-07 DIAGNOSIS — M17.12 ARTHRITIS OF LEFT KNEE: ICD-10-CM

## 2019-01-07 DIAGNOSIS — M67.52 SYNOVIAL PLICA OF LEFT KNEE: ICD-10-CM

## 2019-01-07 PROCEDURE — 99204 OFFICE O/P NEW MOD 45 MIN: CPT | Performed by: FAMILY MEDICINE

## 2019-01-07 PROCEDURE — 73562 X-RAY EXAM OF KNEE 3: CPT | Performed by: FAMILY MEDICINE

## 2019-01-07 NOTE — PROGRESS NOTES
"Robert is a 48 y.o. year old male    Chief Complaint   Patient presents with   • Left Knee - Pain, Consult, Edema     Patient is here today to be seen at the request of Dr. Gonzales.   • Right Knee - Pain, Edema       History of Present Illness   HPI   Patient has been having bilateral knee pain for \"quite some time\".  However over the past month or so has noted more left anterior knee pain especially with long air flights are driving.  No known trauma.  Occasionally does get some swelling in the knees.  No locking or giving way.  Also bilateral knee stiffness consistent with theater sign.  No fever or chills.  Generally both knees are painful over the medial aspect of the joint.    I have reviewed the patient's medical, family, and social history in detail and updated the computerized patient record.    Review of Systems   Constitutional: Negative for fever.   Musculoskeletal:        Per HPI   Skin: Negative for wound.   Neurological: Negative for numbness.   All other systems reviewed and are negative.      /86 (BP Location: Left arm, Patient Position: Sitting, Cuff Size: Large Adult)   Pulse 83   Temp 97.8 °F (36.6 °C) (Oral)   Ht 195.6 cm (77.01\")   Wt 134 kg (295 lb)   SpO2 97%   BMI 34.97 kg/m²      Physical Exam   Constitutional: He is oriented to person, place, and time. He appears well-developed and well-nourished.   HENT:   Head: Normocephalic and atraumatic.   Eyes: Conjunctivae and EOM are normal. Pupils are equal, round, and reactive to light.   Cardiovascular:   No peripheral edema   Pulmonary/Chest: Effort normal.   Musculoskeletal:   Bilateral knee exam normal in general appearance, no effusion or erythema.  Patient does have some patellofemoral crepitus bilaterally.  Negative Lachman, negative Allie.  Patient has full range of motion.  Patient has some mild tenderness over the anterior medial patellar facet joint on the left.   Neurological: He is alert and oriented to person, place, " "and time.   Skin: Skin is warm and dry.   Psychiatric: He has a normal mood and affect. His behavior is normal.   Vitals reviewed.  Bilateral Knee X-Ray  Indication: Pain    Views: AP, Lateral, and [place] Neck    Findings:  Bilateral medial joint compartment narrowing just short of bone-on-bone.  Also patellofemoral arthritis bilaterally with spurring seen in the patellofemoral canal both medially and laterally more so on the left than the right.    No prior studies were available for comparison.        Current Outpatient Medications:   •  ARMOUR THYROID 90 MG tablet, , Disp: , Rfl:   •  B-D 3CC LUER-LISETTE SYR 25GX1\" 25G X 1\" 3 ML misc, USE EVERY WEEK WITH TESTOSTERONE INJECTIONS, Disp: 100 each, Rfl: 2  •  Cholecalciferol ( VITAMIN D3) 1000 UNITS tablet, Take 1 tablet by mouth daily., Disp: , Rfl:   •  fluticasone (FLONASE) 50 MCG/ACT nasal spray, 1 spray into each nostril 2 (Two) Times a Day As Needed for Rhinitis or Allergies., Disp: 3 bottle, Rfl: 3  •  Magnesium 200 MG chewable tablet, Chew., Disp: , Rfl:   •  Omega-3 Fatty Acids (FISH OIL) 1000 MG capsule capsule, Take 1 capsule by mouth daily., Disp: , Rfl:   •  Testosterone Cypionate (DEPOTESTOTERONE CYPIONATE) 200 MG/ML injection, Inject 1 mL into the appropriate muscle as directed by prescriber Every 7 (Seven) Days. Inject 1 ml every 10 days, Disp: 4 mL, Rfl: 5  •  diclofenac (VOLTAREN) 1 % gel gel, Apply 4 g topically to the appropriate area as directed 4 (Four) Times a Day As Needed (arthritis)., Disp: 600 g, Rfl: 11     Diagnoses and all orders for this visit:    Arthritis of right knee  -     XR Knee 3+ View With [place] Neck Right  -     Ambulatory Referral to Physical Therapy  -     Discontinue: diclofenac (VOLTAREN) 1 % gel gel; Apply 4 g topically to the appropriate area as directed 4 (Four) Times a Day As Needed (arthritis).  -     diclofenac (VOLTAREN) 1 % gel gel; Apply 4 g topically to the appropriate area as directed 4 (Four) Times a Day As Needed " (arthritis).    Arthritis of left knee  -     XR Knee 3+ View With [place] Left  -     Ambulatory Referral to Physical Therapy  -     Discontinue: diclofenac (VOLTAREN) 1 % gel gel; Apply 4 g topically to the appropriate area as directed 4 (Four) Times a Day As Needed (arthritis).  -     diclofenac (VOLTAREN) 1 % gel gel; Apply 4 g topically to the appropriate area as directed 4 (Four) Times a Day As Needed (arthritis).    Synovial plica of left knee  -     XR Knee 3+ View With [place] Left    Patellofemoral arthritis  -     Ambulatory Referral to Physical Therapy  -     Discontinue: diclofenac (VOLTAREN) 1 % gel gel; Apply 4 g topically to the appropriate area as directed 4 (Four) Times a Day As Needed (arthritis).  -     diclofenac (VOLTAREN) 1 % gel gel; Apply 4 g topically to the appropriate area as directed 4 (Four) Times a Day As Needed (arthritis).       Patient certainly has exam and history consistent with osteoarthritis.  He states he has gained about 40 pounds over the past several months due to his mother's illness and passing.  He plans to start rectifying this.  It is possible he could have a medial plica however I do not palpate one on the exam today.  Has some moderate patellofemoral arthritic changes left greater right.  And certainly has moderate to almost severe medial joint space narrowing bilaterally.  Treatment options discussed with the patient at this time we will try topical anti-inflammatories and physical therapy, if that is not successful then will consider Visco supplementation and/or intra-articular steroid injection.      EMR Dragon/Transcription disclaimer:    Much of this encounter note is an electronic transcription/translation of spoken language to printed text.  The electronic translation of spoken language may permit erroneous, or at times, nonsensical words or phrases to be inadvertently transcribed.  Although I have reviewed the note for such errors some may still exist.

## 2019-01-09 NOTE — PROGRESS NOTES
Physical Therapy Initial Evaluation and Plan of Care    Patient: Robert Harvey   : 1970  Diagnosis/ICD-10 Code:  Chronic pain of both knees [M25.561, M25.562, G89.29]  Referring practitioner: Keith Fonseca,*    Subjective Evaluation    History of Present Illness  Mechanism of injury: B knee pain for a long time which become worse in the last few months.  I've gained weight in the last few months after mother's death and was in a prolonged kneeling position when this recent flare up began.     X ray revealed Bilateral medial joint compartment narrowing just short of bone-on-bone.  Also patellofemoral arthritis bilaterally with spurring seen in the patellofemoral canal both medially and laterally more so on the left than the right.    PMH significant for plantar fasciitis and thyroid disorder    - giving way/locking    LEFS 58/80      Patient Occupation: travels a lot for workp; lots of driving and air travel Pain  Current pain ratin  At worst pain ratin  Location: occas sharp pain into L medial knee but overall achy in both knees.    Quality: dull ache and sharp  Alleviating factors: elliptical   Exacerbated by: air travel, sitting, driving, descending stairs.    Patient Goals  Patient goals for therapy: decreased pain  Patient goal: wants to know what to do at gym and improve flexiblility           Objective       Tenderness   Left Knee   Tenderness in the medial joint line. No tenderness in the lateral joint line, medial patella and plica.     Right Knee   Tenderness in the medial joint line. No tenderness in the lateral joint line, medial patella and plica tenderness.     Active Range of Motion   Left Knee   Flexion: 130 degrees   Extension: 0 degrees     Right Knee   Flexion: 128 degrees   Extension: 0 degrees     Additional Active Range of Motion Details  Mild lateral patellar tracking with minimal PF crepitus 20-0 degrees     Strength/Myotome Testing     Left Hip   Planes of Motion    Extension: 5  Abduction: 5    Right Hip   Planes of Motion   Extension: 5  Abduction: 5    Left Knee   Flexion: 5  Extension: 5    Right Knee   Flexion: 5  Extension: 5    Tests     Left Hip   Positive Jessy.     Right Hip   Positive Jessy.     Left Knee   Positive bounce home.   Negative patellar compression.     Right Knee   Negative bounce home and patellar compression.          Assessment & Plan     Assessment  Impairments: abnormal or restricted ROM, activity intolerance and pain with function  Assessment details:   Robert Harvey is a pleasant 48 y.o. male that presents presents with signs and symptoms consistent with B knee pain. He presents with mild palp tenderness, decreased AROM, decreased flexibility and decreased sitting/stair tolerance.  Pt would benefit from skilled PT services in order to address listed impairments and increase tolerance to normal daily activities including ADLs, work and recreational activities.     Prognosis: good  Functional Limitations: uncomfortable because of pain and stooping  Goals  Plan Goals: STG In 2 weeks  1. Pt to be independent with HEP  2. Pt to report pain not > 5/10 with ADLs  3. Pt to report decreased pain with prolonged sitting  4. Pt to tolerate progression to CKC activities without increased symtpoms  LTG In 6 weeks  1. Pt able descend staircase with minimal symptoms  2. LEFS >/= 65/80  3. Pt to exhibit >/= 133 degrees of knee flex AROM to allow for traversing objects and squatting as necessary for ADL's  4. Minimal palp tenderness B knees    Plan  Therapy options: will be seen for skilled physical therapy services  Planned modality interventions: electrical stimulation/Russian stimulation, cryotherapy and ultrasound  Planned therapy interventions: manual therapy, gait training, home exercise program, soft tissue mobilization, neuromuscular re-education, stretching and strengthening  Duration in visits: 8  Treatment plan discussed with: patient        Manual  Therapy:    -     mins  60775;  Therapeutic Exercise:    15     mins  56693;     Neuromuscular Velia:    -    mins  20819;    Therapeutic Activity:     -     mins  78001;     Gait Training:      -     mins  17848;     Ultrasound:     -     mins  99455;    Electrical Stimulation:    -     mins  25494 ( );  Iontophoresis                 -     mins 99729      Timed Treatment:   15   mins   Total Treatment:     50   mins    PT SIGNATURE: Nettie Ferguson, PT   [place] License # [id number]  DATE TREATMENT INITIATED: 1/9/2019    Initial Certification  Certification Period: 4/9/2019  I certify that the therapy services are furnished while this patient is under my care.  The services outlined above are required by this patient, and will be reviewed every 90 days.     PHYSICIAN: Keith Fonseca MD      DATE:     Please sign and return via fax to [phone].. Thank you, [facility].

## 2019-01-11 ENCOUNTER — TREATMENT (OUTPATIENT)
Dept: PHYSICAL THERAPY | Facility: CLINIC | Age: 49
End: 2019-01-11

## 2019-01-11 DIAGNOSIS — M17.11 ARTHRITIS OF KNEE, RIGHT: ICD-10-CM

## 2019-01-11 DIAGNOSIS — M25.562 CHRONIC PAIN OF BOTH KNEES: Primary | ICD-10-CM

## 2019-01-11 DIAGNOSIS — R26.2 DIFFICULTY WALKING DOWN STAIRS: ICD-10-CM

## 2019-01-11 DIAGNOSIS — G89.29 CHRONIC PAIN OF BOTH KNEES: Primary | ICD-10-CM

## 2019-01-11 DIAGNOSIS — M25.561 CHRONIC PAIN OF BOTH KNEES: Primary | ICD-10-CM

## 2019-01-11 DIAGNOSIS — M17.12 ARTHRITIS OF KNEE, LEFT: ICD-10-CM

## 2019-01-11 DIAGNOSIS — M17.10 PATELLOFEMORAL ARTHRITIS: ICD-10-CM

## 2019-01-11 PROCEDURE — 97161 PT EVAL LOW COMPLEX 20 MIN: CPT | Performed by: PHYSICAL THERAPIST

## 2019-01-11 PROCEDURE — 97110 THERAPEUTIC EXERCISES: CPT | Performed by: PHYSICAL THERAPIST

## 2019-01-11 NOTE — PATIENT INSTRUCTIONS
Access Code: [email or web address]   URL: [email or web address]   Date: 01/11/2019   Prepared by: Cecy Ferguson     Exercises   Supine Active Straight Leg Raise - 10 reps - 1 sets - 3 hold - 1x daily   Straight Leg Raise with External Rotation - 10 reps - 1 sets - 3 hold - 1x daily   Sidelying Hip Abduction - 10 reps - 2 sets - 3 hold - 1x daily   Supine Heel Slide with Strap - 10 reps - 1 sets - 10 hold - 1x daily   Supine Hamstring Stretch with Strap - 2 reps - 1 sets - 30 hold - 1x daily   Supine ITB Stretch with Strap - 2 reps - 1 sets - 20 hold - 1x daily   Seated Long Arc Quad - 20 reps - 1 sets - 3 hold - 1x daily     Patient was educated on findings of evaluation, purpose of treatment, and goals for therapy.  Treatment options discussed and questions answered.  Patient was educated on exercises/self treatment/pain relief techniques.

## 2019-01-18 ENCOUNTER — TELEPHONE (OUTPATIENT)
Dept: SPORTS MEDICINE | Facility: CLINIC | Age: 49
End: 2019-01-18

## 2019-01-21 ENCOUNTER — TREATMENT (OUTPATIENT)
Dept: PHYSICAL THERAPY | Facility: CLINIC | Age: 49
End: 2019-01-21

## 2019-01-21 DIAGNOSIS — M17.12 ARTHRITIS OF KNEE, LEFT: ICD-10-CM

## 2019-01-21 DIAGNOSIS — M25.561 CHRONIC PAIN OF BOTH KNEES: Primary | ICD-10-CM

## 2019-01-21 DIAGNOSIS — M25.562 CHRONIC PAIN OF BOTH KNEES: Primary | ICD-10-CM

## 2019-01-21 DIAGNOSIS — G89.29 CHRONIC PAIN OF BOTH KNEES: Primary | ICD-10-CM

## 2019-01-21 DIAGNOSIS — R26.2 DIFFICULTY WALKING DOWN STAIRS: ICD-10-CM

## 2019-01-21 DIAGNOSIS — M17.10 PATELLOFEMORAL ARTHRITIS: ICD-10-CM

## 2019-01-21 DIAGNOSIS — M17.11 ARTHRITIS OF KNEE, RIGHT: ICD-10-CM

## 2019-01-21 PROCEDURE — 97110 THERAPEUTIC EXERCISES: CPT | Performed by: PHYSICAL THERAPIST

## 2019-01-21 PROCEDURE — 97035 APP MDLTY 1+ULTRASOUND EA 15: CPT | Performed by: PHYSICAL THERAPIST

## 2019-01-21 PROCEDURE — 97530 THERAPEUTIC ACTIVITIES: CPT | Performed by: PHYSICAL THERAPIST

## 2019-01-21 NOTE — PROGRESS NOTES
Physical Therapy Daily Progress Note        Visit # : 2  Robert Harvey reports: Right knee feels fine - left knee is a little annoying this morning - slipped on ice this morning - felt increased pain left knee     Subjective     Objective       Tenderness   Left Knee   Tenderness in the medial joint line, medial patella and medial retinaculum.      See Exercise, Manual, and Modality Logs for complete treatment.       Assessment & Plan     Assessment  Assessment details: Patient presents with antalgic gait - decreased left LE stance time. Tenderness at left superior medial border of patella - added US to decrease tenderness. Tolerated all strength and stabilization progressions well today. Cont PT 2-3x per week for strength progressions - next visit attempt step activity and/or SLB if tolerated.         Progress strengthening /stabilization /functional activity           Manual Therapy:         mins  96246;  Therapeutic Exercise:    17     mins  17844;     Neuromuscular Velia:        mins  64327;    Therapeutic Activity:     10     mins  17058;     Gait Training:           mins  71752;     Ultrasound:     8     mins  26175;    Electrical Stimulation:         mins  65668 ( );  Dry Needling          mins self-pay    Timed Treatment:   35   mins   Total Treatment:     45   mins    Lilia Mckeon, PT  Physical Therapist  [place] License # [id number]

## 2019-01-31 ENCOUNTER — TREATMENT (OUTPATIENT)
Dept: PHYSICAL THERAPY | Facility: CLINIC | Age: 49
End: 2019-01-31

## 2019-01-31 DIAGNOSIS — M25.561 CHRONIC PAIN OF BOTH KNEES: Primary | ICD-10-CM

## 2019-01-31 DIAGNOSIS — M17.11 ARTHRITIS OF KNEE, RIGHT: ICD-10-CM

## 2019-01-31 DIAGNOSIS — M17.12 ARTHRITIS OF KNEE, LEFT: ICD-10-CM

## 2019-01-31 DIAGNOSIS — G89.29 CHRONIC PAIN OF BOTH KNEES: Primary | ICD-10-CM

## 2019-01-31 DIAGNOSIS — R26.2 DIFFICULTY WALKING DOWN STAIRS: ICD-10-CM

## 2019-01-31 DIAGNOSIS — M17.10 PATELLOFEMORAL ARTHRITIS: ICD-10-CM

## 2019-01-31 DIAGNOSIS — M25.562 CHRONIC PAIN OF BOTH KNEES: Primary | ICD-10-CM

## 2019-01-31 PROCEDURE — 97110 THERAPEUTIC EXERCISES: CPT | Performed by: PHYSICAL THERAPIST

## 2019-01-31 PROCEDURE — 97035 APP MDLTY 1+ULTRASOUND EA 15: CPT | Performed by: PHYSICAL THERAPIST

## 2019-01-31 PROCEDURE — 97530 THERAPEUTIC ACTIVITIES: CPT | Performed by: PHYSICAL THERAPIST

## 2019-01-31 NOTE — PROGRESS NOTES
Physical Therapy Daily Progress Note        Visit # : 3  Robert Harvey reports: My knee still gets really stiff especially with prolonged sitting and/ or slow shuffle gait     Subjective     Objective       Palpation   Left   Tenderness of the distal semimembranosus.     Patellar Mobility   Left Knee Hypomobile in the left medial and left lateral patellar tendon(s).     Right Knee Hypomobile in the medial and lateral patellar tendon(s).      See Exercise, Manual, and Modality Logs for complete treatment.       Assessment & Plan     Assessment  Assessment details: Patient presents with antalgic gait - decreased left LE stance time. Tenderness at left superior medial border of patella - performed US to decrease tenderness. Tolerated all strength and stabilization progressions well today. Cont PT 2-3x per week for strength progressions - next visit attempt step activity and/or SLB if tolerated.         Progress strengthening /stabilization /functional activity           Manual Therapy:         mins  10088;  Therapeutic Exercise:    17     mins  60747;     Neuromuscular Velia:        mins  66617;    Therapeutic Activity:     10     mins  71921;     Gait Training:           mins  14458;     Ultrasound:     8   mins  32116;    Electrical Stimulation:         mins  36243 ( );  Dry Needling          mins self-pay    Timed Treatment:   35   mins   Total Treatment:     45   mins    Lilia Mckeon PT  Physical Therapist  [place] License # [id number]

## 2019-02-23 DIAGNOSIS — E34.9 TESTOSTERONE DEFICIENCY: ICD-10-CM

## 2019-02-25 RX ORDER — NEEDLES, FILTER 19GX1 1/2"
NEEDLE, DISPOSABLE MISCELLANEOUS
Qty: 100 EACH | Refills: 1 | Status: SHIPPED | OUTPATIENT
Start: 2019-02-25 | End: 2019-05-28 | Stop reason: SDUPTHER

## 2019-04-26 ENCOUNTER — RESULTS ENCOUNTER (OUTPATIENT)
Dept: FAMILY MEDICINE CLINIC | Facility: CLINIC | Age: 49
End: 2019-04-26

## 2019-04-26 DIAGNOSIS — E34.9 TESTOSTERONE DEFICIENCY: ICD-10-CM

## 2019-04-26 DIAGNOSIS — Z12.5 SCREENING FOR PROSTATE CANCER: ICD-10-CM

## 2019-04-26 DIAGNOSIS — E03.9 ACQUIRED HYPOTHYROIDISM: ICD-10-CM

## 2019-05-07 ENCOUNTER — TELEPHONE (OUTPATIENT)
Dept: SPORTS MEDICINE | Facility: CLINIC | Age: 49
End: 2019-05-07

## 2019-05-07 NOTE — TELEPHONE ENCOUNTER
Sorry, been trying to find one that might fit, I have placed a hinged knee support with open POP XXXL on your desk that he can try and see if it fits

## 2019-05-07 NOTE — TELEPHONE ENCOUNTER
Regarding: Complaint  Contact: [phone]  ----- Message from Mychart, Generic sent at 5/6/2019  5:01 PM EDT -----    I have not received a return message from my last message.  The recommended knee brace is too small at its largest available size i really need a recommendation for another brace.  Thank you  Lui Haskins    [phone]

## 2019-05-21 LAB
PSA SERPL-MCNC: 0.38 NG/ML (ref 0–4)
T3 SERPL-MCNC: 179 NG/DL (ref 80–200)
T4 FREE SERPL-MCNC: 0.94 NG/DL (ref 0.93–1.7)
TESTOST FREE SERPL-MCNC: 28.1 PG/ML (ref 6.8–21.5)
TESTOST SERPL-MCNC: 814 NG/DL (ref 264–916)
TSH SERPL DL<=0.005 MIU/L-ACNC: 3.49 MIU/ML (ref 0.27–4.2)

## 2019-05-22 ENCOUNTER — TELEPHONE (OUTPATIENT)
Dept: FAMILY MEDICINE CLINIC | Facility: CLINIC | Age: 49
End: 2019-05-22

## 2019-05-22 NOTE — TELEPHONE ENCOUNTER
We will need a paper fax order from that pharm to me  As that drug is not FDA approved and not available for me to order from our EPIC database.

## 2019-05-22 NOTE — TELEPHONE ENCOUNTER
Patient does not want to take [place] Thyroid, he would like Nature Throid 65mg.  He said he used to take one a day but thinks he should take 2 every morning.  He gets it at [facility] on [address]  [phone]

## 2019-05-23 DIAGNOSIS — E34.9 TESTOSTERONE DEFICIENCY: ICD-10-CM

## 2019-05-28 ENCOUNTER — OFFICE VISIT (OUTPATIENT)
Dept: FAMILY MEDICINE CLINIC | Facility: CLINIC | Age: 49
End: 2019-05-28

## 2019-05-28 VITALS
HEIGHT: 77 IN | DIASTOLIC BLOOD PRESSURE: 88 MMHG | RESPIRATION RATE: 17 BRPM | BODY MASS INDEX: 34.48 KG/M2 | OXYGEN SATURATION: 98 % | HEART RATE: 88 BPM | SYSTOLIC BLOOD PRESSURE: 144 MMHG | WEIGHT: 292 LBS | TEMPERATURE: 97.8 F

## 2019-05-28 DIAGNOSIS — E03.9 ACQUIRED HYPOTHYROIDISM: Primary | ICD-10-CM

## 2019-05-28 DIAGNOSIS — J30.1 CHRONIC SEASONAL ALLERGIC RHINITIS DUE TO POLLEN: ICD-10-CM

## 2019-05-28 DIAGNOSIS — E34.9 TESTOSTERONE DEFICIENCY: ICD-10-CM

## 2019-05-28 DIAGNOSIS — Z00.00 ROUTINE ADULT HEALTH MAINTENANCE: ICD-10-CM

## 2019-05-28 DIAGNOSIS — Z51.81 MEDICATION MONITORING ENCOUNTER: ICD-10-CM

## 2019-05-28 DIAGNOSIS — E55.9 VITAMIN D DEFICIENCY: ICD-10-CM

## 2019-05-28 PROCEDURE — 99213 OFFICE O/P EST LOW 20 MIN: CPT | Performed by: FAMILY MEDICINE

## 2019-05-28 RX ORDER — TESTOSTERONE CYPIONATE 200 MG/ML
INJECTION, SOLUTION INTRAMUSCULAR
Qty: 4 ML | Refills: 5 | Status: SHIPPED | OUTPATIENT
Start: 2019-05-28 | End: 2019-09-03 | Stop reason: SDUPTHER

## 2019-05-28 RX ORDER — FLUTICASONE PROPIONATE 50 MCG
1 SPRAY, SUSPENSION (ML) NASAL 2 TIMES DAILY PRN
Qty: 1 BOTTLE | Refills: 5 | Status: SHIPPED | OUTPATIENT
Start: 2019-05-28 | End: 2020-03-16 | Stop reason: SDUPTHER

## 2019-05-28 NOTE — PROGRESS NOTES
"  Subjective   Robert Harvey is a 49 y.o. male who is here for   Chief Complaint   Patient presents with   • Follow-up     on labwork   • Nasal Congestion   • Sore Throat   • Hypothyroidism   .     History of Present Illness   Hypothyroidism: Patient presents for evaluation of thyroid function. Symptoms consist of fatigue, weight gain. Symptoms have present for several years. The symptoms are mild.  The problem has been gradually improving.  Previous thyroid studies include TSH and total T4. The hypothyroidism is due to hypothyroidism.  We changed to [place] thyroid in Nov. He does not feel well on [place], so we went back to his Nature thyroid , as he found a compound pharm that still has it.  Again he did not feel well on generic synthroid either.    Back on depo T, and levels are ok    Hay fever is in flair        The following portions of the patient's history were reviewed and updated as appropriate: allergies, current medications, past family history, past medical history, past social history, past surgical history and problem list.    Review of Systems    Objective   Physical Exam   Constitutional: He appears well-developed and well-nourished.   HENT:   Nose: Mucosal edema present.   Neck: No thyromegaly present.   Cardiovascular: Normal rate.   Pulmonary/Chest: Effort normal.   Nursing note and vitals reviewed.      Assessment/Plan   Robert was seen today for follow-up, nasal congestion, sore throat and hypothyroidism.    Diagnoses and all orders for this visit:    Acquired hypothyroidism  -     Lipid Panel; Future  -     TSH; Future  -     T4, Free; Future  -     T3; Future    Medication monitoring encounter    Testosterone deficiency  -     Syringe/Needle, Disp, (BD INTEGRA SYRINGE) 25G X 1\" 3 ML misc; Inject 1 Units into the appropriate muscle as directed by prescriber 1 (One) Time Per Week.  -     Testosterone, Free, Total; Future    Chronic seasonal allergic rhinitis due to pollen  -     fluticasone " "(FLONASE) 50 MCG/ACT nasal spray; 1 spray into the nostril(s) as directed by provider 2 (Two) Times a Day As Needed for Rhinitis or Allergies.    Routine adult health maintenance  -     CBC (No Diff); Future  -     Comprehensive Metabolic Panel; Future    Vitamin D deficiency  -     Vitamin D 25 Hydroxy; Future    Other orders  -     Zoster Vac Recomb Adjuvanted (SHINGRIX) 50 MCG/0.5ML reconstituted suspension; Inject 50 mcg into the appropriate muscle as directed by prescriber Every 6 (Six) Months for 2 doses.      There are no Patient Instructions on file for this visit.    Medications Discontinued During This Encounter   Medication Reason   • ARMOUR THYROID 90 MG tablet *Therapy completed   • BD INTEGRA SYRINGE 25G X 1\" 3 ML misc Reorder   • fluticasone (FLONASE) 50 MCG/ACT nasal spray Reorder        Return in about 3 months (around 8/28/2019) for new medication follow up.    Dr. Contreras Gonzales  [place], [place]    "

## 2019-05-28 NOTE — PROGRESS NOTES
"Subjective   Robert Harvey is a 49 y.o. male presents for   Chief Complaint   Patient presents with   • Follow-up     on labwork   • Nasal Congestion   • Sore Throat   • Eye Burn       History of Present Illness     The following portions of the patient's history were reviewed and updated as appropriate: allergies, current medications, past family history, past medical history, past social history, past surgical history and problem list.    Review of Systems   All other systems reviewed and are negative.        Vitals:    19 0934   BP: 144/88   Pulse: 88   Resp: 17   Temp: 97.8 °F (36.6 °C)   SpO2: 98%   Weight: 132 kg (292 lb)   Height: 195.6 cm (77\")     Wt Readings from Last 3 Encounters:   19 132 kg (292 lb)   19 134 kg (295 lb)   18 (!) 174 kg (384 lb)     BP Readings from Last 3 Encounters:   19 144/88   19 164/86   18 154/90     Social History     Socioeconomic History   • Marital status:      Spouse name: Not on file   • Number of children: Not on file   • Years of education: Not on file   • Highest education level: Not on file   Occupational History   • Occupation: field/service medical equipment repairs      Comment: [employer]     Employer: SELF-EMPLOYED   Tobacco Use   • Smoking status: Former Smoker     Packs/day: 1.00     Types: Cigarettes     Last attempt to quit:      Years since quittin.4   • Smokeless tobacco: Never Used   Substance and Sexual Activity   • Alcohol use: Yes     Comment: 3 drinks per week   • Drug use: No   • Sexual activity: Yes     Partners: Female       Allergies   Allergen Reactions   • Penicillins        Body mass index is 34.63 kg/m².    Objective   Physical Exam    Assessment/Plan   There are no diagnoses linked to this encounter.      Contreras Gonzales MD    [facility]  [address] [address]  Dept: [phone]  Dept Fax: " [phone]  Loc: [phone]  Loc Fax: [phone]

## 2019-07-12 ENCOUNTER — CLINICAL SUPPORT (OUTPATIENT)
Dept: RETAIL CLINIC | Facility: CLINIC | Age: 49
End: 2019-07-12

## 2019-07-12 DIAGNOSIS — Z11.1 VISIT FOR TB SKIN TEST: Primary | ICD-10-CM

## 2019-07-12 PROCEDURE — 86580 TB INTRADERMAL TEST: CPT | Performed by: NURSE PRACTITIONER

## 2019-07-12 NOTE — PROGRESS NOTES
"Pt here for TB skin test.  Pt answering all questions on questionnaire with \"No\" and without any issues.  Questions answered and TB skin test given to left lower f/a.  No bleeding noted, area left open to area.  Pt to return to clinic in 48-72 hours for reading of TB skin test; Pt verb. Understanding.  "

## 2019-07-15 LAB
INDURATION: 0 MM (ref 0–10)
Lab: NORMAL
Lab: NORMAL
TB SKIN TEST: NEGATIVE

## 2019-07-23 RX ORDER — LEVOTHYROXINE AND LIOTHYRONINE 38; 9 UG/1; UG/1
60 TABLET ORAL DAILY
Qty: 30 TABLET | Refills: 0 | Status: SHIPPED | OUTPATIENT
Start: 2019-07-23 | End: 2019-07-25

## 2019-07-25 ENCOUNTER — TELEPHONE (OUTPATIENT)
Dept: FAMILY MEDICINE CLINIC | Facility: CLINIC | Age: 49
End: 2019-07-25

## 2019-07-25 RX ORDER — LEVOTHYROXINE AND LIOTHYRONINE 38; 9 UG/1; UG/1
60 TABLET ORAL DAILY
Qty: 30 TABLET | Refills: 2 | Status: SHIPPED | OUTPATIENT
Start: 2019-07-25 | End: 2019-07-29 | Stop reason: SDUPTHER

## 2019-07-25 NOTE — TELEPHONE ENCOUNTER
Compound pharmacy called stating patient does not want thyroid medicine that was sent into [facility], would like the NP Thyroid 60 mg sent to compound instead, please advise.

## 2019-07-29 ENCOUNTER — TELEPHONE (OUTPATIENT)
Dept: FAMILY MEDICINE CLINIC | Facility: CLINIC | Age: 49
End: 2019-07-29

## 2019-07-29 DIAGNOSIS — E03.9 ACQUIRED HYPOTHYROIDISM: Primary | ICD-10-CM

## 2019-07-29 RX ORDER — LEVOTHYROXINE AND LIOTHYRONINE 38; 9 UG/1; UG/1
60 TABLET ORAL DAILY
Qty: 30 TABLET | Refills: 2 | Status: SHIPPED | OUTPATIENT
Start: 2019-07-29 | End: 2019-09-03 | Stop reason: SDUPTHER

## 2019-08-13 ENCOUNTER — OFFICE VISIT (OUTPATIENT)
Dept: FAMILY MEDICINE CLINIC | Facility: CLINIC | Age: 49
End: 2019-08-13

## 2019-08-13 VITALS
RESPIRATION RATE: 16 BRPM | HEIGHT: 77 IN | DIASTOLIC BLOOD PRESSURE: 84 MMHG | BODY MASS INDEX: 37.19 KG/M2 | SYSTOLIC BLOOD PRESSURE: 138 MMHG | WEIGHT: 315 LBS | OXYGEN SATURATION: 98 % | HEART RATE: 79 BPM

## 2019-08-13 DIAGNOSIS — H61.22 IMPACTED CERUMEN OF LEFT EAR: ICD-10-CM

## 2019-08-13 DIAGNOSIS — H61.21 IMPACTED CERUMEN OF RIGHT EAR: Primary | ICD-10-CM

## 2019-08-13 PROCEDURE — 69209 REMOVE IMPACTED EAR WAX UNI: CPT | Performed by: FAMILY MEDICINE

## 2019-08-13 NOTE — PROGRESS NOTES
Procedure   Ear Cerumen Removal  Date/Time: 8/13/2019 2:14 PM  Performed by: Contreras Gonzales MD  Authorized by: Contreras Gonzales MD   Consent: Verbal consent obtained.  Risks and benefits: risks, benefits and alternatives were discussed  Consent given by: patient  Patient understanding: patient states understanding of the procedure being performed  Patient identity confirmed: verbally with patient    Anesthesia:  Local Anesthetic: none  Ceruminolytics applied: Ceruminolytics applied prior to the procedure.  Location details: left ear  Patient tolerance: Patient tolerated the procedure well with no immediate complications  Comments: Robert reports left ear all plugged , can not hear  Exam cerumen impaction  Small diameter canals  Cerumen down deep in canal  Warm water hydrogen peroxide flushes.  CHEMA Mata worked for 15 min  Then I had to work 15 min with a combo of water gun and manual curetting  Finally cleared the canal.       Procedure type: irrigation   Sedation:  Patient sedated: no

## 2019-08-23 ENCOUNTER — PROCEDURE VISIT (OUTPATIENT)
Dept: FAMILY MEDICINE CLINIC | Facility: CLINIC | Age: 49
End: 2019-08-23

## 2019-08-23 VITALS
HEIGHT: 77 IN | HEART RATE: 86 BPM | SYSTOLIC BLOOD PRESSURE: 140 MMHG | OXYGEN SATURATION: 98 % | BODY MASS INDEX: 37.19 KG/M2 | WEIGHT: 315 LBS | DIASTOLIC BLOOD PRESSURE: 86 MMHG

## 2019-08-23 DIAGNOSIS — H61.21 EXCESSIVE CERUMEN IN RIGHT EAR CANAL: Primary | ICD-10-CM

## 2019-08-26 ENCOUNTER — RESULTS ENCOUNTER (OUTPATIENT)
Dept: FAMILY MEDICINE CLINIC | Facility: CLINIC | Age: 49
End: 2019-08-26

## 2019-08-26 DIAGNOSIS — Z00.00 ROUTINE ADULT HEALTH MAINTENANCE: ICD-10-CM

## 2019-08-26 DIAGNOSIS — E34.9 TESTOSTERONE DEFICIENCY: ICD-10-CM

## 2019-08-26 DIAGNOSIS — E55.9 VITAMIN D DEFICIENCY: ICD-10-CM

## 2019-08-26 DIAGNOSIS — E03.9 ACQUIRED HYPOTHYROIDISM: ICD-10-CM

## 2019-08-26 PROCEDURE — 69209 REMOVE IMPACTED EAR WAX UNI: CPT | Performed by: FAMILY MEDICINE

## 2019-08-26 NOTE — PROGRESS NOTES
Procedure   Ear Cerumen Removal  Date/Time: 8/26/2019 7:53 AM  Performed by: Contreras Gonzales MD  Authorized by: Contreras Gonzales MD   Consent: Verbal consent obtained.  Risks and benefits: risks, benefits and alternatives were discussed  Consent given by: patient  Patient understanding: patient states understanding of the procedure being performed  Patient identity confirmed: verbally with patient    Anesthesia:  Local Anesthetic: none  Ceruminolytics applied: Ceruminolytics applied prior to the procedure.  Location details: right ear  Patient tolerance: Patient tolerated the procedure well with no immediate complications  Comments: Robert returns today to have his right ear canal cleaned  We were able to clean our his left ear canal las visit  Esperanza BEAR was able to use squirt gun to flush out the canal  I was able to visualize a clear canal and normal TM  Procedure type: irrigation   Sedation:  Patient sedated: no

## 2019-08-27 LAB
25(OH)D3+25(OH)D2 SERPL-MCNC: 48.7 NG/ML (ref 30–100)
ALBUMIN SERPL-MCNC: 4.5 G/DL (ref 3.5–5.2)
ALBUMIN/GLOB SERPL: 2 G/DL
ALP SERPL-CCNC: 70 U/L (ref 39–117)
ALT SERPL-CCNC: 34 U/L (ref 1–41)
AST SERPL-CCNC: 32 U/L (ref 1–40)
BILIRUB SERPL-MCNC: 0.8 MG/DL (ref 0.2–1.2)
BUN SERPL-MCNC: 11 MG/DL (ref 6–20)
BUN/CREAT SERPL: 8.7 (ref 7–25)
CALCIUM SERPL-MCNC: 9.4 MG/DL (ref 8.6–10.5)
CHLORIDE SERPL-SCNC: 100 MMOL/L (ref 98–107)
CHOLEST SERPL-MCNC: 156 MG/DL (ref 0–200)
CO2 SERPL-SCNC: 23.3 MMOL/L (ref 22–29)
CREAT SERPL-MCNC: 1.27 MG/DL (ref 0.76–1.27)
ERYTHROCYTE [DISTWIDTH] IN BLOOD BY AUTOMATED COUNT: 14.5 % (ref 12.3–15.4)
GLOBULIN SER CALC-MCNC: 2.2 GM/DL
GLUCOSE SERPL-MCNC: 108 MG/DL (ref 65–99)
HCT VFR BLD AUTO: 57.1 % (ref 37.5–51)
HDLC SERPL-MCNC: 26 MG/DL (ref 40–60)
HGB BLD-MCNC: 18.5 G/DL (ref 13–17.7)
LDLC SERPL CALC-MCNC: 97 MG/DL (ref 0–100)
MCH RBC QN AUTO: 30.9 PG (ref 26.6–33)
MCHC RBC AUTO-ENTMCNC: 32.4 G/DL (ref 31.5–35.7)
MCV RBC AUTO: 95.5 FL (ref 79–97)
PLATELET # BLD AUTO: 187 10*3/MM3 (ref 140–450)
POTASSIUM SERPL-SCNC: 4.6 MMOL/L (ref 3.5–5.2)
PROT SERPL-MCNC: 6.7 G/DL (ref 6–8.5)
RBC # BLD AUTO: 5.98 10*6/MM3 (ref 4.14–5.8)
SODIUM SERPL-SCNC: 140 MMOL/L (ref 136–145)
T3 SERPL-MCNC: 134 NG/DL (ref 80–200)
T4 FREE SERPL-MCNC: 0.98 NG/DL (ref 0.93–1.7)
TESTOST FREE SERPL-MCNC: 29.1 PG/ML (ref 7.2–24)
TESTOST SERPL-MCNC: 954 NG/DL (ref 264–916)
TRIGL SERPL-MCNC: 166 MG/DL (ref 0–150)
TSH SERPL DL<=0.005 MIU/L-ACNC: 3.92 MIU/ML (ref 0.27–4.2)
VLDLC SERPL CALC-MCNC: 33.2 MG/DL
WBC # BLD AUTO: 7.9 10*3/MM3 (ref 3.4–10.8)

## 2019-09-03 ENCOUNTER — OFFICE VISIT (OUTPATIENT)
Dept: FAMILY MEDICINE CLINIC | Facility: CLINIC | Age: 49
End: 2019-09-03

## 2019-09-03 VITALS
HEIGHT: 77 IN | HEART RATE: 68 BPM | WEIGHT: 315 LBS | BODY MASS INDEX: 37.19 KG/M2 | DIASTOLIC BLOOD PRESSURE: 74 MMHG | OXYGEN SATURATION: 98 % | SYSTOLIC BLOOD PRESSURE: 140 MMHG

## 2019-09-03 DIAGNOSIS — E66.01 CLASS 3 SEVERE OBESITY DUE TO EXCESS CALORIES WITH SERIOUS COMORBIDITY AND BODY MASS INDEX (BMI) OF 40.0 TO 44.9 IN ADULT (HCC): ICD-10-CM

## 2019-09-03 DIAGNOSIS — E03.9 ACQUIRED HYPOTHYROIDISM: Primary | ICD-10-CM

## 2019-09-03 DIAGNOSIS — E34.9 TESTOSTERONE DEFICIENCY: ICD-10-CM

## 2019-09-03 DIAGNOSIS — D75.1 SECONDARY ERYTHROCYTOSIS: ICD-10-CM

## 2019-09-03 PROCEDURE — 99213 OFFICE O/P EST LOW 20 MIN: CPT | Performed by: FAMILY MEDICINE

## 2019-09-03 RX ORDER — TESTOSTERONE CYPIONATE 200 MG/ML
200 INJECTION, SOLUTION INTRAMUSCULAR
Qty: 4 ML | Refills: 5 | Status: SHIPPED | OUTPATIENT
Start: 2019-09-03 | End: 2020-03-16 | Stop reason: SDUPTHER

## 2019-09-03 RX ORDER — LEVOTHYROXINE AND LIOTHYRONINE 38; 9 UG/1; UG/1
60 TABLET ORAL DAILY
Qty: 90 TABLET | Refills: 3 | Status: SHIPPED | OUTPATIENT
Start: 2019-09-03 | End: 2020-09-28 | Stop reason: SDUPTHER

## 2019-09-03 NOTE — PROGRESS NOTES
Subjective   Robert Harvey is a 49 y.o. male who is here for   Chief Complaint   Patient presents with   • Hypothyroidism   • Leg Pain     rt leg behind knee sharp pain lower hamstring, down into ankle    .     History of Present Illness     {Common H&P Review Areas:21203}    Review of Systems    Objective   Physical Exam    Assessment/Plan   {Assess/PlanSmartLinks:77066}  There are no Patient Instructions on file for this visit.    There are no discontinued medications.     No Follow-up on file.    Dr. Contreras Gonzales  [facility]  [place], [place]

## 2019-09-03 NOTE — PROGRESS NOTES
"Chief Complaint   Patient presents with   • Hypothyroidism   • Leg Pain     rt leg behind knee sharp pain lower hamstring, down into ankle        Hypothyroidism: Patient presents for evaluation of thyroid function. Symptoms consist of denies fatigue, weight changes, heat/cold intolerance, bowel/skin changes or CVS symptoms. Symptoms have present for several years. The symptoms are mild.  The problem has been controlled.  Previous thyroid studies include TSH. The hypothyroidism is due to hypothyroidism.    Ear canals feel all clear    Doing well on depo T    Labs ok    RBC count is up some.    Left posterior knee is still painful, he may call Sarah for knee eval/injections      Vitals:    09/03/19 0811   BP: 140/74   BP Location: Left arm   Patient Position: Sitting   Cuff Size: Large Adult   Pulse: 68   SpO2: 98%   Weight: (!) 172 kg (379 lb)   Height: 195.6 cm (77\")     Gen: well appearing, alert  Eyes: EOMI, no eye bulge  Neck: no LAD. Thyroid normal size, no nodules, no tenderness  Lung: good air movement, regular RR  Heart: RR without murmur  Skin: no rash.    Lab Results   Component Value Date    TSH 3.920 08/26/2019           Assessment/Plan   Robert was seen today for hypothyroidism and leg pain.    Diagnoses and all orders for this visit:    Acquired hypothyroidism  -     Thyroid (NP THYROID) 60 MG PO tablet; Take 1 tablet by mouth Daily.    Testosterone deficiency  -     Testosterone Cypionate (DEPOTESTOTERONE CYPIONATE) 200 MG/ML injection; Inject 1 mL into the appropriate muscle as directed by prescriber Every 7 (Seven) Days.    Secondary erythrocytosis    Class 3 severe obesity due to excess calories with serious comorbidity and body mass index (BMI) of 40.0 to 44.9 in adult (CMS/HCC)             There are no Patient Instructions on file for this visit.    Continue to take thyroid medication on a regular basis, same time of day, on an empty stomach. Repeat thyroid labs in six months.    Dr. Chairez" Janet NUNES  [place], [place]  [facility]

## 2019-10-18 ENCOUNTER — IMMUNIZATION (OUTPATIENT)
Dept: RETAIL CLINIC | Facility: CLINIC | Age: 49
End: 2019-10-18

## 2019-10-18 DIAGNOSIS — Z23 NEED FOR TDAP VACCINATION: Primary | ICD-10-CM

## 2019-10-18 NOTE — PROGRESS NOTES
Pt here for Tdap vaccine.  Pt completing questionnaire and no issues noted.  Tdap given to  Left deltoid; no bleeding noted, bandaid applied, all done under aseptic technique.  Pt tolerated well. No issues noted.  See Scanned in paperwork for more details.

## 2019-10-24 ENCOUNTER — OFFICE VISIT (OUTPATIENT)
Dept: SPORTS MEDICINE | Facility: CLINIC | Age: 49
End: 2019-10-24

## 2019-10-24 VITALS
DIASTOLIC BLOOD PRESSURE: 72 MMHG | OXYGEN SATURATION: 98 % | WEIGHT: 315 LBS | BODY MASS INDEX: 37.19 KG/M2 | HEIGHT: 77 IN | HEART RATE: 68 BPM | SYSTOLIC BLOOD PRESSURE: 142 MMHG | TEMPERATURE: 98.2 F

## 2019-10-24 DIAGNOSIS — M17.12 ARTHRITIS OF LEFT KNEE: Primary | ICD-10-CM

## 2019-10-24 DIAGNOSIS — M17.11 ARTHRITIS OF RIGHT KNEE: ICD-10-CM

## 2019-10-24 DIAGNOSIS — M79.604 RIGHT LEG PAIN: ICD-10-CM

## 2019-10-24 PROCEDURE — 99213 OFFICE O/P EST LOW 20 MIN: CPT | Performed by: FAMILY MEDICINE

## 2019-10-24 RX ORDER — PREDNISONE 10 MG/1
TABLET ORAL
Qty: 30 TABLET | Refills: 0 | Status: SHIPPED | OUTPATIENT
Start: 2019-10-24 | End: 2020-01-31

## 2019-10-24 NOTE — PROGRESS NOTES
"Robert is a 49 y.o. year old male follow up on a problem familiar to this examiner.     Chief Complaint   Patient presents with   • Knee Pain       History of Present Illness   HPI   1.  For the past 5 to 6 months patient has been having discomfort right posterior thigh with radiation to upper calf that occurs with prolonged sitting such as in his work van.  Driving in a car is not as bad.  No known trauma.  Pain is described as a sharp knifelike pain with radiation as mentioned above.  No back pain.  No GI/ complaints.  2.  Patient with a history of bilateral knee arthritis, his left knee is more bothersome when he walks in his right knee is more bothersome when he sits for prolonged periods.  He has no swelling, locking, or giving way.  Earlier this year we saw him for similar complaints and was prescribed Voltaren gel and physical therapy which he states has been somewhat helpful for the left knee but not so much for the right knee.  He would like to discuss other treatment options for knee arthritis.  He has been successful in losing weight which has been somewhat helpful.    I have reviewed the patient's medical, family, and social history in detail and updated the computerized patient record.    Review of Systems   Constitutional: Negative for fever.   Musculoskeletal:        Per HPI   Skin: Negative for wound.   Neurological: Negative for numbness.   All other systems reviewed and are negative.      /72 (BP Location: Left arm, Patient Position: Sitting, Cuff Size: Adult)   Pulse 68   Temp 98.2 °F (36.8 °C) (Oral)   Ht 195.6 cm (77\")   Wt (!) 162 kg (357 lb 8 oz)   SpO2 98%   BMI 42.39 kg/m²      Physical Exam   Constitutional: He is oriented to person, place, and time. He appears well-developed and well-nourished.   HENT:   Head: Normocephalic and atraumatic.   Eyes: Conjunctivae and EOM are normal. Pupils are equal, round, and reactive to light.   Cardiovascular:   No peripheral edema " "  Pulmonary/Chest: Effort normal.   Musculoskeletal:   Right leg with some mild tenderness to palpation over the mid substance medial lateral hamstring.  Negative straight leg raise.  Motor 5 out of 5.    Bilateral knee arthrosis with crepitus.  No effusion erythema.   Neurological: He is alert and oriented to person, place, and time.   Skin: Skin is warm and dry.   Psychiatric: He has a normal mood and affect. His behavior is normal.   Vitals reviewed.        Current Outpatient Medications:   •  Cholecalciferol (SM VITAMIN D3) 1000 UNITS tablet, Take 1 tablet by mouth daily., Disp: , Rfl:   •  diclofenac (VOLTAREN) 1 % gel gel, Apply 4 g topically to the appropriate area as directed 4 (Four) Times a Day As Needed (arthritis)., Disp: 600 g, Rfl: 11  •  fluticasone (FLONASE) 50 MCG/ACT nasal spray, 1 spray into the nostril(s) as directed by provider 2 (Two) Times a Day As Needed for Rhinitis or Allergies., Disp: 1 bottle, Rfl: 5  •  Magnesium 200 MG chewable tablet, Chew., Disp: , Rfl:   •  Omega-3 Fatty Acids (FISH OIL) 1000 MG capsule capsule, Take 1 capsule by mouth daily., Disp: , Rfl:   •  predniSONE (DELTASONE) 10 MG tablet, 4 tabs qd x 3 d, then 3 tabs qd x 3 d, then 2 tabs qd x 3 d, then 1 tab qd  x 3 d, Disp: 30 tablet, Rfl: 0  •  Syringe/Needle, Disp, (BD INTEGRA SYRINGE) 25G X 1\" 3 ML misc, Inject 1 Units into the appropriate muscle as directed by prescriber 1 (One) Time Per Week., Disp: 50 each, Rfl: 0  •  Testosterone Cypionate (DEPOTESTOTERONE CYPIONATE) 200 MG/ML injection, Inject 1 mL into the appropriate muscle as directed by prescriber Every 7 (Seven) Days., Disp: 4 mL, Rfl: 5  •  Thyroid (NP THYROID) 60 MG PO tablet, Take 1 tablet by mouth Daily., Disp: 90 tablet, Rfl: 3  •  Zoster Vac Recomb Adjuvanted (SHINGRIX) 50 MCG/0.5ML reconstituted suspension, Inject 50 mcg into the appropriate muscle as directed by prescriber Every 6 (Six) Months for 2 doses., Disp: 1 each, Rfl: 1     Diagnoses and all " orders for this visit:    Arthritis of left knee  -     predniSONE (DELTASONE) 10 MG tablet; 4 tabs qd x 3 d, then 3 tabs qd x 3 d, then 2 tabs qd x 3 d, then 1 tab qd  x 3 d    Arthritis of right knee  -     predniSONE (DELTASONE) 10 MG tablet; 4 tabs qd x 3 d, then 3 tabs qd x 3 d, then 2 tabs qd x 3 d, then 1 tab qd  x 3 d    Right leg pain  -     predniSONE (DELTASONE) 10 MG tablet; 4 tabs qd x 3 d, then 3 tabs qd x 3 d, then 2 tabs qd x 3 d, then 1 tab qd  x 3 d  -     Ambulatory Referral to Physical Therapy       1.  Discussed with the patient and gave him a handout on nonsurgical options for knee arthritis.  We will contact his insurance company in regards to Visco supplementation however marissa over the past few months has been denying these claims.  Also discussed with him regenerative medicine options including PRP and stem cells.  He has a trip planned for [employer] next spring and is wondering if a corticosteroid injection could be helpful prior to this trip which I think it would be and will plan to do this 3 to 4 weeks prior to his trip to [place].  2.  In regards to his right leg hamstring syndrome we will try tapering prednisone and refer to physical therapy.      EMR Dragon/Transcription disclaimer:    Much of this encounter note is an electronic transcription/translation of spoken language to printed text.  The electronic translation of spoken language may permit erroneous, or at times, nonsensical words or phrases to be inadvertently transcribed.  Although I have reviewed the note for such errors some may still exist.

## 2019-10-25 ENCOUNTER — TREATMENT (OUTPATIENT)
Dept: PHYSICAL THERAPY | Facility: CLINIC | Age: 49
End: 2019-10-25

## 2019-10-25 DIAGNOSIS — M79.604 RIGHT LEG PAIN: Primary | ICD-10-CM

## 2019-10-25 DIAGNOSIS — R26.2 WALKING DIFFICULTY DUE TO LOWER LEG: ICD-10-CM

## 2019-10-25 PROCEDURE — 97161 PT EVAL LOW COMPLEX 20 MIN: CPT | Performed by: PHYSICAL THERAPIST

## 2019-10-25 PROCEDURE — 97110 THERAPEUTIC EXERCISES: CPT | Performed by: PHYSICAL THERAPIST

## 2019-10-25 PROCEDURE — 97035 APP MDLTY 1+ULTRASOUND EA 15: CPT | Performed by: PHYSICAL THERAPIST

## 2019-10-25 NOTE — PROGRESS NOTES
"     Physical Therapy Initial Evaluation and Plan of Care    Patient: Robert Harvey   : 1970  Diagnosis/ICD-10 Code:  Right leg pain [M79.604]  Referring practitioner: Keith Fonseca,*    Subjective Evaluation    History of Present Illness  Onset date: March   Mechanism of injury: Pt is a 50 y/o WM who reports to the clinic with c/o right leg pain with long periods of sitting and driving.  Pt describes the pain as sharp and tightness, almost \"like a muscle cramp, but states he does not get the cramp.\"  Pt states the pain is into the right distal thigh, but it has gone into the upper calf.  Pt states he was here for PT back in -Feb for knees pain, but this pain is different from his regular knee joint pain.  Pt states he is currently still doing his exercises and it seems to be helping.  Pt is planning on going to [place] in March, so needs to get his leg better so he can walk and sit without pain.  Pt reports the pain has been progressive since 2019 with long periods of sitting and walking.  Pt denies any issues with his back except of the occasional back spasm, but denies having any N/T into his legs.        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  .     Patient Goals  Patient goals for therapy: decreased pain and increased strength             Objective       Observations     Additional " Observation Details  No bruising or swelling noted right leg     Pt ambulates into clinic without an AD, but ambulates with B LE in ER and HE at the knees     Palpation     Right Tenderness of the distal biceps femoris.     Active Range of Motion     Lumbar   Flexion: 100 degrees   Extension: 50 degrees with pain  Left lateral flexion: 100 degrees   Right lateral flexion: 100 degrees   Left rotation: 100 degrees   Right rotation: 100 degrees   Left Knee   Flexion: 126 degrees   Extension: WFL    Right Knee   Flexion: 128 degrees   Extension: WFL    Additional Active Range of Motion Details  B knees has HE     Strength/Myotome Testing     Left Hip   Planes of Motion   Flexion: 5  Extension: 5  Abduction: 5  Adduction: 4    Right Hip   Planes of Motion   Flexion: 5  Extension: 5  Abduction: 5  Adduction: 4    Left Knee   Flexion: 5  Prone flexion: 4  Extension: 5  Quadriceps contraction: good    Right Knee   Flexion: 4  Prone flexion: 4  Extension: 5  Quadriceps contraction: good    Tests     Left Hip   Negative Ely's, PASHA and piriformis.   90/90 SLR: Negative.   SLR: Negative.     Right Hip   Negative Ely's, PASHA and piriformis.   90/90 SLR: Negative.  SLR: Negative.          Assessment & Plan     Assessment  Impairments: activity intolerance, impaired physical strength, lacks appropriate home exercise program, pain with function and weight-bearing intolerance  Assessment details: Pt is a 50 y/o WM who reports to the clinic with c/o right posterior thigh pain, tenderness, decreased hamstring stretch, and decreased functional mobility due to posterior thigh pain with long periods of standing, sitting and ambulation.        Prognosis: good  Functional Limitations: walking, uncomfortable because of pain, sitting and standing  Goals  Plan Goals: STGs: 2-3 weeks  1. Decrease right posterior thigh pain 4/10 with sitting, standing and ambulation   2.  Decrease right distal lateral hamstring tenderness to minimal to none  with palpation.     3.  Pt is independent and compliant with doing his HEP at least 3-4x/week.      LTGs: 4-6 weeks  1.  Increase right lateral hamstring strength to 5/5    2.  Increase right hip add strength to 5/5   3.  Pt without right distal lateral hamstring tenderness with palpation   4.  Pt reports a 1-2/10 right posterior thigh pain with long periods of sitting, standing and ambulation.        Plan  Therapy options: will be seen for skilled physical therapy services  Planned modality interventions: cryotherapy, electrical stimulation/Russian stimulation, thermotherapy (hydrocollator packs), ultrasound and iontophoresis  Planned therapy interventions: joint mobilization, home exercise program, flexibility, strengthening, stretching, functional ROM exercises and therapeutic activities  Duration in visits: 12  Treatment plan discussed with: patient        Manual Therapy:         mins  54912;  Therapeutic Exercise:   20      mins  45792;     Neuromuscular Velia:        mins  58696;    Therapeutic Activity:          mins  62057;     Gait Training:           mins  19103;     Ultrasound:    8      mins  31639;    Electrical Stimulation:         mins  05679 ( );  Dry Needling          mins self-pay    Timed Treatment:  28    mins   Total Treatment:     44   mins    PT SIGNATURE: Melani Tamez, EILEEN   DATE TREATMENT INITIATED: 10/25/2019    Initial Certification  Certification Period: 1/23/2020  I certify that the therapy services are furnished while this patient is under my care.  The services outlined above are required by this patient, and will be reviewed every 90 days.     PHYSICIAN: Keith Fonseca MD      DATE:     Please sign and return via fax to [phone].. Thank you, [facility].

## 2019-10-29 ENCOUNTER — TELEPHONE (OUTPATIENT)
Dept: SPORTS MEDICINE | Facility: CLINIC | Age: 49
End: 2019-10-29

## 2019-10-29 NOTE — TELEPHONE ENCOUNTER
[employer]: BENEFIT NOT COVERED       [employer] doesn't cover orthovisc or monovisc.     Please advice.    Calling patient to inform him of results. Thank you.   ----- Message from Keith Fonseca MD sent at 10/24/2019 11:28 AM EDT -----  Need to see if insurance will pay for viscosupplementation for bilateral knee arthrits

## 2019-10-31 NOTE — TELEPHONE ENCOUNTER
Its a Discount program offered to patients whom dont have coverage through their insurance. I explained that to the patient, but he refused since he is changing insurance soon and steroid injection seems to be done fine for him. He preferred to wait for his new insurance to cover it.

## 2019-10-31 NOTE — TELEPHONE ENCOUNTER
Patient is aware that insurance doesn't cover Orthovisc.   Patient was not interested in the visco discount program since soon he will change insurance.

## 2019-11-01 ENCOUNTER — TREATMENT (OUTPATIENT)
Dept: PHYSICAL THERAPY | Facility: CLINIC | Age: 49
End: 2019-11-01

## 2019-11-01 DIAGNOSIS — M79.604 RIGHT LEG PAIN: Primary | ICD-10-CM

## 2019-11-01 DIAGNOSIS — R26.2 WALKING DIFFICULTY DUE TO LOWER LEG: ICD-10-CM

## 2019-11-01 PROCEDURE — 97035 APP MDLTY 1+ULTRASOUND EA 15: CPT | Performed by: PHYSICAL THERAPIST

## 2019-11-01 PROCEDURE — 97110 THERAPEUTIC EXERCISES: CPT | Performed by: PHYSICAL THERAPIST

## 2019-11-01 NOTE — PROGRESS NOTES
"Physical Therapy Daily Progress Note    Visit # : 2  Robert Harvey reports: he is feeling better and he drove a lot over this last week.  Pt c/o his knee only hurting a couple times per day described as a jabbing/ \"ice pick\" in the back of his knee.  Pt can reposition and the pain improves.  Pt rates his pain a 4-5/10.      Subjective     Objective       Palpation     Right Tenderness of the distal semimembranosus and distal semitendinosus.     Additional Palpation Details  Pt with minimal point tenderness over the right medial distal hamstring      See Exercise, Manual, and Modality Logs for complete treatment.       Assessment & Plan     Assessment  Assessment details: Pt is responding to treatment with decreasing pain, tenderness, and increasing tolerance to the strengthening exercises without pain.  Will continue to see pt once per week for strengthening, stretching, and modalities PRN for pain.  Pt will be out of town next week due to personal issues.  Will follow up with pt on 11/15/2019.           Progress per Plan of Care           Manual Therapy:         mins  99891;  Therapeutic Exercise:   25      mins  06719;     Neuromuscular Velia:        mins  09116;    Therapeutic Activity:          mins  22283;     Gait Training:           mins  59781;     Ultrasound:     8     mins  29844;    Electrical Stimulation:         mins  05661 ( );  Dry Needling          mins self-pay    Timed Treatment:  33    mins   Total Treatment:    35    mins    Melani Tamez, PT  Physical Therapist  "

## 2019-11-15 ENCOUNTER — TREATMENT (OUTPATIENT)
Dept: PHYSICAL THERAPY | Facility: CLINIC | Age: 49
End: 2019-11-15

## 2019-11-15 DIAGNOSIS — M79.604 RIGHT LEG PAIN: Primary | ICD-10-CM

## 2019-11-15 DIAGNOSIS — R26.2 WALKING DIFFICULTY DUE TO LOWER LEG: ICD-10-CM

## 2019-11-15 PROCEDURE — 97035 APP MDLTY 1+ULTRASOUND EA 15: CPT | Performed by: PHYSICAL THERAPIST

## 2019-11-15 PROCEDURE — 97110 THERAPEUTIC EXERCISES: CPT | Performed by: PHYSICAL THERAPIST

## 2019-11-15 NOTE — PROGRESS NOTES
Physical Therapy Daily Progress Note    Visit # : 3  Robert Harvey reports: some days it hurts and some days it doesn't.  It still hurts the most when he is driving 8 hours or more and describes it as someone is stabbing his knee a billion times.  Overall, pt states his knee is getting better.      Subjective     Objective       Palpation     Right Tenderness of the distal semimembranosus and distal semitendinosus.     Additional Palpation Details  Pt with minimal point tenderness over the right distal medial hamstring      See Exercise, Manual, and Modality Logs for complete treatment.       Assessment & Plan     Assessment  Assessment details: Pt is responding to treatment with decreasing pain, tenderness, and increasing tolerance to the strengthening exercises without pain.  Attempted standing mini squats, but pt was unable to tolerate due to increased B knee joint pain.  Pt was able to tolerate increased height with step up and band walking.   Will continue to see pt once per week for strengthening, stretching, and modalities PRN for pain.          Progress per Plan of Care           Manual Therapy:         mins  59096;  Therapeutic Exercise:   28      mins  11711;     Neuromuscular Velia:        mins  56443;    Therapeutic Activity:          mins  28964;     Gait Training:           mins  84080;     Ultrasound:     8     mins  67695;    Electrical Stimulation:         mins  77739 ( );  Dry Needling          mins self-pay    Timed Treatment: 36     mins   Total Treatment:     36   mins    Melani Tamez, PT  Physical Therapist

## 2019-11-22 ENCOUNTER — TREATMENT (OUTPATIENT)
Dept: PHYSICAL THERAPY | Facility: CLINIC | Age: 49
End: 2019-11-22

## 2019-11-22 DIAGNOSIS — M79.604 RIGHT LEG PAIN: Primary | ICD-10-CM

## 2019-11-22 DIAGNOSIS — R26.2 WALKING DIFFICULTY DUE TO LOWER LEG: ICD-10-CM

## 2019-11-22 PROCEDURE — 97035 APP MDLTY 1+ULTRASOUND EA 15: CPT | Performed by: PHYSICAL THERAPIST

## 2019-11-22 PROCEDURE — 97110 THERAPEUTIC EXERCISES: CPT | Performed by: PHYSICAL THERAPIST

## 2019-11-22 NOTE — PROGRESS NOTES
Physical Therapy Daily Progress Note    Visit # : 4  Robert Harvey reports: the back of his knee is hurting him today. States he went shopping with his wife today and did a lot of walking. It has felt really stiff all day and it was difficult to bend his knee to get into the car.      Subjective     Objective   See Exercise, Manual, and Modality Logs for complete treatment.     Mild right posterior knee tenderness over distal medial hamstring.      Assessment & Plan     Assessment  Assessment details: Pt is responding to treatment with decreasing pain, tenderness, and increasing tolerance to the strengthening exercises without pain.   Pt was able to tolerate all increases and new strengthening exercises without pain.  Still has isolated point tenderness over the distal medial hamstring, but decreasing tenderness and pain. Pt reports his pain is more intermittent vs a daily constant pain.  Will continue to see pt once per week for strengthening, stretching, and modalities PRN for pain.          Progress per Plan of Care           Manual Therapy:         mins  38810;  Therapeutic Exercise:   30      mins  40725;     Neuromuscular Velia:        mins  40084;    Therapeutic Activity:          mins  63669;     Gait Training:           mins  20541;     Ultrasound:     8     mins  30811;    Electrical Stimulation:         mins  76936 ( );  Dry Needling          mins self-pay    Timed Treatment:  38    mins   Total Treatment:     41   mins    Melani Tamez, PT  Physical Therapist

## 2019-12-06 ENCOUNTER — TREATMENT (OUTPATIENT)
Dept: PHYSICAL THERAPY | Facility: CLINIC | Age: 49
End: 2019-12-06

## 2019-12-06 DIAGNOSIS — G89.29 CHRONIC PAIN OF BOTH KNEES: ICD-10-CM

## 2019-12-06 DIAGNOSIS — M17.11 ARTHRITIS OF KNEE, RIGHT: ICD-10-CM

## 2019-12-06 DIAGNOSIS — R26.2 WALKING DIFFICULTY DUE TO LOWER LEG: ICD-10-CM

## 2019-12-06 DIAGNOSIS — M25.562 CHRONIC PAIN OF BOTH KNEES: ICD-10-CM

## 2019-12-06 DIAGNOSIS — M79.604 RIGHT LEG PAIN: Primary | ICD-10-CM

## 2019-12-06 DIAGNOSIS — M17.12 ARTHRITIS OF KNEE, LEFT: ICD-10-CM

## 2019-12-06 DIAGNOSIS — M25.561 CHRONIC PAIN OF BOTH KNEES: ICD-10-CM

## 2019-12-06 PROCEDURE — 97033 APP MDLTY 1+IONTPHRSIS EA 15: CPT | Performed by: PHYSICAL THERAPIST

## 2019-12-06 PROCEDURE — 97110 THERAPEUTIC EXERCISES: CPT | Performed by: PHYSICAL THERAPIST

## 2019-12-06 NOTE — PROGRESS NOTES
Physical Therapy Daily Progress Note/reassessment     Visit # : 5  Robert Harvey reports: increased pain since last visit.  Pt stopped doing the step up exercise, because it seemed like the one that was bothering it the most.  Pt states it did not hurt during the exercise, but post exercise.  Pt rates his pain a 2/10-6/10 and describes the pain as a hamstring cramp especially when he is driving.      Subjective     Objective       Palpation     Right Tenderness of the distal semimembranosus and distal semitendinosus.     Additional Palpation Details  Pt with mild point tenderness over the right distal medial hamstring tendon.      Active Range of Motion     Right Knee   Flexion: 143 degrees     Additional Active Range of Motion Details  Right knee 6 degrees of HE     Strength/Myotome Testing     Right Hip   Planes of Motion   Extension: 5    Right Knee   Flexion: 5  Prone flexion: 4  Extension: 5    Tests     Right Hip   SLR: Negative.      See Exercise, Manual, and Modality Logs for complete treatment.       Assessment & Plan     Assessment  Assessment details: Pt is responding to treatment, but in the last two weeks re-injured the right hamstring after last visit.  Pt states his pain and tenderness has increased with decreased tolerance to some of the exercises.  Pt is still making progress in therapy with good ROM and improving knee strength.  Pt's pain and tenderness isolated over medial hamstring, so changed modality to ionto patch vs US.  Pt has met 4/7 goals and set 2 new goals.  Will continue to see pt once per week for another 2-4 weeks for exercise progression and pain control.       Plan Goals: STGs: 2-3 weeks  1. Decrease right posterior thigh pain 4/10 with sitting, standing and ambulation  PARTIALLY MET   2.  Decrease right distal lateral hamstring tenderness to minimal to none with palpation.   MET   3.  Pt is independent and compliant with doing his HEP at least 3-4x/week.  MET     LTGs: 4-6  weeks  1.  Increase right lateral hamstring strength to 5/5  MET   2.  Increase right hip add strength to 5/5  NOT MET   3.  Pt without right distal lateral hamstring tenderness with palpation MET   4.  Pt reports a 1-2/10 right posterior thigh pain with long periods of sitting, standing and ambulation.  NOT MET    NEW STGs: 2 weeks- New goals made 12/6/2019  1.  Decrease right distal medial hamstring tenderness to palpation to minimal to none  2.  Increase right medial hamstring strength to 5/5     Progress per Plan of Care         Manual Therapy:         mins  66462;  Therapeutic Exercise:  25       mins  62228;     Neuromuscular Velia:        mins  32806;    Therapeutic Activity:          mins  68064;     Gait Training:           mins  14968;     Ultrasound:          mins  04644;    Electrical Stimulation:         mins  21510 ( );  Dry Needling          mins self-pay  ionto patch                    8 mins 77490     Timed Treatment:  33    mins   Total Treatment:     33   mins    Melani Tamez, PT  Physical Therapist

## 2019-12-16 ENCOUNTER — TELEPHONE (OUTPATIENT)
Dept: SPORTS MEDICINE | Facility: CLINIC | Age: 49
End: 2019-12-16

## 2019-12-16 RX ORDER — MELOXICAM 15 MG/1
15 TABLET ORAL DAILY PRN
Qty: 30 TABLET | Refills: 1 | Status: SHIPPED | OUTPATIENT
Start: 2019-12-16 | End: 2019-12-16 | Stop reason: SDUPTHER

## 2019-12-16 RX ORDER — MELOXICAM 15 MG/1
15 TABLET ORAL DAILY PRN
Qty: 30 TABLET | Refills: 1 | Status: SHIPPED | OUTPATIENT
Start: 2019-12-16 | End: 2020-03-19

## 2019-12-16 NOTE — TELEPHONE ENCOUNTER
Called and states that he has a cramp behind his knee and traveled down to his ankle. All on the right side. He said it started at 4am and lasts about 3 hours. He walks it off and then it comes back again. He is concerned because he has to drive 6 hours straight tomorrow and doesn't want this to affect him. Please advise any suggestions or anything he can take for this. Thank you!

## 2019-12-20 ENCOUNTER — TREATMENT (OUTPATIENT)
Dept: PHYSICAL THERAPY | Facility: CLINIC | Age: 49
End: 2019-12-20

## 2019-12-20 DIAGNOSIS — M79.604 RIGHT LEG PAIN: Primary | ICD-10-CM

## 2019-12-20 DIAGNOSIS — M25.561 CHRONIC PAIN OF BOTH KNEES: ICD-10-CM

## 2019-12-20 DIAGNOSIS — M25.562 CHRONIC PAIN OF BOTH KNEES: ICD-10-CM

## 2019-12-20 DIAGNOSIS — M17.12 ARTHRITIS OF KNEE, LEFT: ICD-10-CM

## 2019-12-20 DIAGNOSIS — R26.2 WALKING DIFFICULTY DUE TO LOWER LEG: ICD-10-CM

## 2019-12-20 DIAGNOSIS — G89.29 CHRONIC PAIN OF BOTH KNEES: ICD-10-CM

## 2019-12-20 PROCEDURE — 97110 THERAPEUTIC EXERCISES: CPT | Performed by: PHYSICAL THERAPIST

## 2019-12-20 PROCEDURE — 97035 APP MDLTY 1+ULTRASOUND EA 15: CPT | Performed by: PHYSICAL THERAPIST

## 2019-12-20 NOTE — PROGRESS NOTES
Physical Therapy Daily Progress Note    Visit # : 6  Robert Harvey reports: his pain is not good.  Pt rates his pain a 2/10 today, but at it's worse it is a 7/10 described as increased muscle spasms behind his right knee.  The pain usually starts with any amount of time in a certain position.        Subjective     Objective       Palpation     Right Tenderness of the distal semimembranosus and distal semitendinosus.     Additional Palpation Details  Pt with minimal right distal and medial hamstrings.      Active Range of Motion     Lumbar   Flexion: 100 degrees   Extension: 75 degrees with pain  Left lateral flexion: 100 degrees   Right lateral flexion: 100 degrees with pain  Left rotation: 100 degrees   Right rotation: 100 degrees     Additional Active Range of Motion Details  Pt with minimal right lower lumbar spine pain with right SB.      Strength/Myotome Testing     Left Knee   Flexion: 5  Extension: 5    Right Knee   Flexion: 4+  Extension: 5    Tests       Thoracic   Negative slump.     Lumbar     Left   Negative crossed SLR and passive SLR.     Right   Negative crossed SLR and passive SLR.     Left Hip   Negative PASHA.   90/90 SLR: Negative.   SLR: Positive.     Right Hip   Negative PASHA.   90/90 SLR: Negative.  SLR: Positive.     Additional Tests Details  Pt with minimal posterior knee pain during SLR test, but denies LBP or shooting pain down leg.  Pt's SLR test was > or = 70 degrees of hip flex prior to feeling pain posterior knee.       See Exercise, Manual, and Modality Logs for complete treatment.       Assessment & Plan     Assessment  Assessment details: Pt continues to c/o right posterior thigh pain with sitting or driving his car for extended amounts of time.  Questioned if pt's symptoms were radicular from back, but pt with negative back special test and denies having any Hx of LBP or injuries to his back.  Pt continues to have isolated medial distal hamstring tenderness with some medial  hamstring weakness.  Pt stated he did not see a difference in using the ionto patch, so did US treatment today and continued to strengthen the hamstring per pt's tolerance.  Will continue 1-2 more visits and if symptoms continue will refer pt back to MD soon.         Progress per Plan of Care         Manual Therapy:         mins  53778;  Therapeutic Exercise:    28     mins  42709;     Neuromuscular Velia:        mins  48575;    Therapeutic Activity:          mins  40116;     Gait Training:           mins  12245;     Ultrasound:    8      mins  90858;    Electrical Stimulation:         mins  26156 ( );  Dry Needling          mins self-pay    Timed Treatment:  36    mins   Total Treatment:     36   mins    Melani Tamez, PT  Physical Therapist

## 2020-01-03 ENCOUNTER — TREATMENT (OUTPATIENT)
Dept: PHYSICAL THERAPY | Facility: CLINIC | Age: 50
End: 2020-01-03

## 2020-01-03 DIAGNOSIS — M79.604 RIGHT LEG PAIN: Primary | ICD-10-CM

## 2020-01-03 DIAGNOSIS — R26.2 WALKING DIFFICULTY DUE TO LOWER LEG: ICD-10-CM

## 2020-01-03 PROCEDURE — 97035 APP MDLTY 1+ULTRASOUND EA 15: CPT | Performed by: PHYSICAL THERAPIST

## 2020-01-03 PROCEDURE — 97110 THERAPEUTIC EXERCISES: CPT | Performed by: PHYSICAL THERAPIST

## 2020-01-03 NOTE — PROGRESS NOTES
Physical Therapy Daily Progress Note    Visit # : 7  Robert Harvey reports: his leg is still the same.  States his pain continues to bother him when he is sitting for a period of time.  Pt states it did it just sitting down in the waiting room along the outside of right thigh.  Pt describes the pain as sharp.      Subjective     Objective       Tenderness     Additional Tenderness Details  Minimal pint tenderness right distal thigh- between lateral and medial hamstring muscle belly      See Exercise, Manual, and Modality Logs for complete treatment.       Assessment & Plan     Assessment  Assessment details: Pt continues to c/o right posterior thigh pain with sitting or driving his car for extended amounts of time. Pt continues to have isolated distal hamstring tenderness and pain with lateral step ups.  Pt was able to perform all others exercises without pain.  Holding PT treatment until pt sees MD 1/31 due to pt not showing much improvements in the last few weeks.          Other           Manual Therapy:         mins  31966;  Therapeutic Exercise:    30     mins  30657;     Neuromuscular Velia:        mins  71188;    Therapeutic Activity:          mins  27591;     Gait Training:           mins  69450;     Ultrasound:    8      mins  52949;    Electrical Stimulation:         mins  18447 ( );  Dry Needling          mins self-pay    Timed Treatment:  38    mins   Total Treatment:    40    mins    Melani Tamez, PT  Physical Therapist

## 2020-01-23 DIAGNOSIS — M79.604 RIGHT LEG PAIN: Primary | ICD-10-CM

## 2020-01-30 ENCOUNTER — HOSPITAL ENCOUNTER (OUTPATIENT)
Dept: MRI IMAGING | Facility: HOSPITAL | Age: 50
Discharge: HOME | End: 2020-01-30
Admitting: FAMILY MEDICINE

## 2020-01-30 DIAGNOSIS — M79.604 RIGHT LEG PAIN: ICD-10-CM

## 2020-01-30 PROCEDURE — 73718 MRI LOWER EXTREMITY W/O DYE: CPT

## 2020-01-31 ENCOUNTER — OFFICE VISIT (OUTPATIENT)
Dept: SPORTS MEDICINE | Facility: CLINIC | Age: 50
End: 2020-01-31

## 2020-01-31 VITALS
DIASTOLIC BLOOD PRESSURE: 78 MMHG | OXYGEN SATURATION: 97 % | SYSTOLIC BLOOD PRESSURE: 150 MMHG | HEIGHT: 77 IN | TEMPERATURE: 98.1 F | HEART RATE: 70 BPM | WEIGHT: 315 LBS | BODY MASS INDEX: 37.19 KG/M2

## 2020-01-31 DIAGNOSIS — M17.12 ARTHRITIS OF LEFT KNEE: Primary | ICD-10-CM

## 2020-01-31 DIAGNOSIS — M17.11 ARTHRITIS OF RIGHT KNEE: ICD-10-CM

## 2020-01-31 PROCEDURE — 99213 OFFICE O/P EST LOW 20 MIN: CPT | Performed by: FAMILY MEDICINE

## 2020-01-31 PROCEDURE — 20610 DRAIN/INJ JOINT/BURSA W/O US: CPT | Performed by: FAMILY MEDICINE

## 2020-01-31 RX ORDER — METHYLPREDNISOLONE ACETATE 80 MG/ML
80 INJECTION, SUSPENSION INTRA-ARTICULAR; INTRALESIONAL; INTRAMUSCULAR; SOFT TISSUE ONCE
Status: COMPLETED | OUTPATIENT
Start: 2020-01-31 | End: 2020-01-31

## 2020-01-31 RX ADMIN — METHYLPREDNISOLONE ACETATE 80 MG: 80 INJECTION, SUSPENSION INTRA-ARTICULAR; INTRALESIONAL; INTRAMUSCULAR; SOFT TISSUE at 10:38

## 2020-01-31 RX ADMIN — METHYLPREDNISOLONE ACETATE 80 MG: 80 INJECTION, SUSPENSION INTRA-ARTICULAR; INTRALESIONAL; INTRAMUSCULAR; SOFT TISSUE at 10:36

## 2020-01-31 NOTE — PROGRESS NOTES
"Robert is a 49 y.o. year old male evaluation of a problem that is new to this examiner.    Chief Complaint   Patient presents with   • Knee Pain     bilateral would like to get injection       History of Present Illness   HPI   1.  Patient here to follow-up right hamstring syndrome, his mid thigh pain has improved however having more discomfort right posterior knee and slightly lateral knee.  No paresthesias at this time.  Patient had MRI done of the right femur yesterday, I have reviewed that report with him and the femur looks good, the medial knee shows moderate arthritis and there is a small popliteal cyst.  2.  Patient with a history of bilateral medial knee arthritis, I have reviewed with him nonsurgical options.  He has a trip planned to [place] in the next few weeks and would like to go ahead and get intra-articular steroid injections today if possible.    I have reviewed the patient's medical, family, and social history in detail and updated the computerized patient record.    Review of Systems   Constitutional: Negative for fever.   Musculoskeletal:        Per HPI   Skin: Negative for wound.   Neurological: Negative for numbness.   All other systems reviewed and are negative.      /78 (BP Location: Left arm, Patient Position: Sitting, Cuff Size: Large Adult)   Pulse 70   Temp 98.1 °F (36.7 °C) (Oral)   Ht 195.6 cm (77.01\")   Wt (!) 164 kg (361 lb)   SpO2 97%   BMI 42.80 kg/m²      Physical Exam   Constitutional: He is oriented to person, place, and time. He appears well-developed and well-nourished.   HENT:   Head: Normocephalic and atraumatic.   Eyes: Pupils are equal, round, and reactive to light. Conjunctivae and EOM are normal.   Cardiovascular:   No peripheral edema   Pulmonary/Chest: Effort normal.   Musculoskeletal:   Bilateral knees with mild effusion.  Medial arthrosis.  No erythema or warmth.   Neurological: He is alert and oriented to person, place, and time.   Skin: Skin is warm and " "dry.   Psychiatric: He has a normal mood and affect. His behavior is normal.   Vitals reviewed.  Knee Injection Procedure Note    Bilateral knee injection was discussed with the patient in detail, including indication, risks, benefits, and alternatives. Verbal consent was given for the procedure. Injection was performed by MD.  Injection site was identified by physical examination and cleaned with Betadine and alcohol swabs. Prior to needle insertion, ethyl chloride spray was used for surface anesthesia. Sterile technique was used.  A 25-gauge, 1.5\" needle was directed to the joint from a(n) medial and anterior approach. Injectate was passed into the joint space without difficulty. The needle was removed and a simple bandage was applied. The procedure was tolerated well without difficulty.    Injection mixture:  1% lidocaine without epinephrine: 3 mL each    Depo Medrol 80 mg/ml: 1 ml each        Current Outpatient Medications:   •  Cholecalciferol (SM VITAMIN D3) 1000 UNITS tablet, Take 1 tablet by mouth daily., Disp: , Rfl:   •  diclofenac (VOLTAREN) 1 % gel gel, Apply 4 g topically to the appropriate area as directed 4 (Four) Times a Day As Needed (arthritis)., Disp: 600 g, Rfl: 11  •  fluticasone (FLONASE) 50 MCG/ACT nasal spray, 1 spray into the nostril(s) as directed by provider 2 (Two) Times a Day As Needed for Rhinitis or Allergies., Disp: 1 bottle, Rfl: 5  •  Magnesium 200 MG chewable tablet, Chew., Disp: , Rfl:   •  meloxicam (MOBIC) 15 MG tablet, Take 1 tablet by mouth Daily As Needed (leg pain)., Disp: 30 tablet, Rfl: 1  •  Omega-3 Fatty Acids (FISH OIL) 1000 MG capsule capsule, Take 1 capsule by mouth daily., Disp: , Rfl:   •  Syringe/Needle, Disp, (BD INTEGRA SYRINGE) 25G X 1\" 3 ML misc, Inject 1 Units into the appropriate muscle as directed by prescriber 1 (One) Time Per Week., Disp: 50 each, Rfl: 0  •  Testosterone Cypionate (DEPOTESTOTERONE CYPIONATE) 200 MG/ML injection, Inject 1 mL into the " appropriate muscle as directed by prescriber Every 7 (Seven) Days., Disp: 4 mL, Rfl: 5  •  Thyroid (NP THYROID) 60 MG PO tablet, Take 1 tablet by mouth Daily., Disp: 90 tablet, Rfl: 3  No current facility-administered medications for this visit.      Diagnoses and all orders for this visit:    Arthritis of left knee  -     methylPREDNISolone acetate (DEPO-medrol) injection 80 mg    Arthritis of right knee  -     methylPREDNISolone acetate (DEPO-medrol) injection 80 mg       Postinjection instructions given regarding ice and activity.  I believe most of the patient's right posterior knee pain is secondary to the popliteal cyst and will hopefully subside with the intra-articular steroid injection today.      EMR Dragon/Transcription disclaimer:    Much of this encounter note is an electronic transcription/translation of spoken language to printed text.  The electronic translation of spoken language may permit erroneous, or at times, nonsensical words or phrases to be inadvertently transcribed.  Although I have reviewed the note for such errors some may still exist.

## 2020-03-04 DIAGNOSIS — E03.9 ACQUIRED HYPOTHYROIDISM: Primary | ICD-10-CM

## 2020-03-04 DIAGNOSIS — Z00.00 PHYSICAL EXAM: ICD-10-CM

## 2020-03-07 LAB
ALBUMIN SERPL-MCNC: 4.4 G/DL (ref 3.5–5.2)
ALBUMIN/GLOB SERPL: 2.1 G/DL
ALP SERPL-CCNC: 64 U/L (ref 39–117)
ALT SERPL-CCNC: 25 U/L (ref 1–41)
AST SERPL-CCNC: 22 U/L (ref 1–40)
BILIRUB SERPL-MCNC: 1 MG/DL (ref 0.2–1.2)
BUN SERPL-MCNC: 14 MG/DL (ref 6–20)
BUN/CREAT SERPL: 11.1 (ref 7–25)
CALCIUM SERPL-MCNC: 8.9 MG/DL (ref 8.6–10.5)
CHLORIDE SERPL-SCNC: 102 MMOL/L (ref 98–107)
CHOLEST SERPL-MCNC: 153 MG/DL (ref 0–200)
CO2 SERPL-SCNC: 23.7 MMOL/L (ref 22–29)
CREAT SERPL-MCNC: 1.26 MG/DL (ref 0.76–1.27)
ERYTHROCYTE [DISTWIDTH] IN BLOOD BY AUTOMATED COUNT: 12.9 % (ref 12.3–15.4)
GLOBULIN SER CALC-MCNC: 2.1 GM/DL
GLUCOSE SERPL-MCNC: 138 MG/DL (ref 65–99)
HCT VFR BLD AUTO: 53.5 % (ref 37.5–51)
HDLC SERPL-MCNC: 31 MG/DL (ref 40–60)
HGB BLD-MCNC: 18.9 G/DL (ref 13–17.7)
LDLC SERPL CALC-MCNC: 93 MG/DL (ref 0–100)
MCH RBC QN AUTO: 32.1 PG (ref 26.6–33)
MCHC RBC AUTO-ENTMCNC: 35.3 G/DL (ref 31.5–35.7)
MCV RBC AUTO: 90.8 FL (ref 79–97)
PLATELET # BLD AUTO: 182 10*3/MM3 (ref 140–450)
POTASSIUM SERPL-SCNC: 4.4 MMOL/L (ref 3.5–5.2)
PROT SERPL-MCNC: 6.5 G/DL (ref 6–8.5)
PSA SERPL-MCNC: 0.44 NG/ML (ref 0–4)
RBC # BLD AUTO: 5.89 10*6/MM3 (ref 4.14–5.8)
SODIUM SERPL-SCNC: 138 MMOL/L (ref 136–145)
TRIGL SERPL-MCNC: 143 MG/DL (ref 0–150)
TSH SERPL DL<=0.005 MIU/L-ACNC: 4.27 UIU/ML (ref 0.27–4.2)
UNABLE TO VOID: NORMAL
VLDLC SERPL CALC-MCNC: 28.6 MG/DL
WBC # BLD AUTO: 7.17 10*3/MM3 (ref 3.4–10.8)

## 2020-03-09 LAB
HBA1C MFR BLD: 5.55 % (ref 4.8–5.6)
WRITTEN AUTHORIZATION: NORMAL

## 2020-03-16 ENCOUNTER — RESULTS ENCOUNTER (OUTPATIENT)
Dept: FAMILY MEDICINE CLINIC | Facility: CLINIC | Age: 50
End: 2020-03-16

## 2020-03-16 ENCOUNTER — OFFICE VISIT (OUTPATIENT)
Dept: FAMILY MEDICINE CLINIC | Facility: CLINIC | Age: 50
End: 2020-03-16

## 2020-03-16 VITALS
SYSTOLIC BLOOD PRESSURE: 128 MMHG | OXYGEN SATURATION: 99 % | BODY MASS INDEX: 37.19 KG/M2 | DIASTOLIC BLOOD PRESSURE: 74 MMHG | WEIGHT: 315 LBS | HEIGHT: 77 IN | HEART RATE: 70 BPM

## 2020-03-16 DIAGNOSIS — R73.01 ELEVATED FASTING GLUCOSE: ICD-10-CM

## 2020-03-16 DIAGNOSIS — Z00.00 ROUTINE ADULT HEALTH MAINTENANCE: Primary | ICD-10-CM

## 2020-03-16 DIAGNOSIS — Z12.11 SCREENING FOR COLON CANCER: ICD-10-CM

## 2020-03-16 DIAGNOSIS — Z12.5 SCREENING FOR PROSTATE CANCER: ICD-10-CM

## 2020-03-16 DIAGNOSIS — E03.9 ACQUIRED HYPOTHYROIDISM: ICD-10-CM

## 2020-03-16 DIAGNOSIS — R71.8 ELEVATED RED BLOOD CELL COUNT: ICD-10-CM

## 2020-03-16 DIAGNOSIS — J30.1 CHRONIC SEASONAL ALLERGIC RHINITIS DUE TO POLLEN: ICD-10-CM

## 2020-03-16 DIAGNOSIS — E34.9 TESTOSTERONE DEFICIENCY: ICD-10-CM

## 2020-03-16 PROCEDURE — 99396 PREV VISIT EST AGE 40-64: CPT | Performed by: FAMILY MEDICINE

## 2020-03-16 RX ORDER — TESTOSTERONE CYPIONATE 200 MG/ML
200 INJECTION, SOLUTION INTRAMUSCULAR
Qty: 4 ML | Refills: 5 | Status: SHIPPED | OUTPATIENT
Start: 2020-03-16 | End: 2020-09-23

## 2020-03-16 RX ORDER — FLUTICASONE PROPIONATE 50 MCG
1 SPRAY, SUSPENSION (ML) NASAL 2 TIMES DAILY PRN
Qty: 1 BOTTLE | Refills: 5 | Status: SHIPPED | OUTPATIENT
Start: 2020-03-16 | End: 2020-09-21

## 2020-03-16 NOTE — PROGRESS NOTES
"  Chief Complaint   Patient presents with   • Annual Exam   • Hypothyroidism       Subjective   Robert Harvey is a 49 y.o. male and is here for a yearly physical exam. The patient reports problems - right knee is better, went over his mri, and dr aggarwal notes.    Do you take any herbs or supplements that were not prescribed by a doctor? yes. If so, these will be added to active medication list.    The following portions of the patient's history were reviewed and updated as appropriate: allergies, current medications, past family history, past medical history, past social history, past surgical history and problem list.    Social and Family and Surgical History reviewed and updated today, see Rooming tab.    Health History, Preventive Measures and Vaccination flow sheets reviewed and updated today.    Patient's current medical chart in Epic; including previous office notes, imaging, labs, specialist's evaluation either in notes or in Media tab reviewed today.    Other pertinent medical information also reviewed thru Care Everywhere function is also reviewed today.    Review of Systems  Review of Systems  A comprehensive review of systems was negative except for: Musculoskeletal: positive for stiff joints  Allergic/Immunologic: positive for hay fever    Vitals:    03/16/20 0851   BP: 128/74   BP Location: Left arm   Patient Position: Sitting   Cuff Size: Adult   Pulse: 70   SpO2: 99%   Weight: (!) 162 kg (358 lb)   Height: 195.6 cm (77.01\")       General Appearance:  Alert, cooperative, no distress, appears stated age   Head:  Normocephalic, without obvious abnormality, atraumatic   Eyes:  PERRL, conjunctiva/corneas clear, EOM's intact.   Ears:  Normal TM's and external ear canals, both ears   Nose: Nares normal, septum midline, mucosa normal, no drainage or sinus tenderness   Throat: Lips, mucosa, and tongue normal; teeth and gums normal   Neck: Supple, symmetrical, trachea midline, no adenopathy;   thyroid: No " enlargement/tenderness/nodules; no carotid  bruit   Back:  Symmetric, no curvature, ROM normal, no CVA tenderness   Lungs:  Clear to auscultation bilaterally, respirations unlabored   Chest wall:  No tenderness or deformity   Heart:  Regular rate and rhythm, S1 and S2 normal, no murmur, rub or gallop   Abdomen:  Soft, non-tender, bowel sounds active all four quadrants,   no masses, no organomegaly   Rectal:        Extremities: Extremities normal, atraumatic, no cyanosis or edema   Pulses: 2+ and symmetric all extremities   Skin: Skin color, texture, turgor normal, no rashes or lesions   Lymph nodes: Cervical, supraclavicular, and axillary nodes normal   Neurologic: CNII-XII intact. Normal strength, sensation and reflexes   throughout          Results for orders placed or performed in visit on 03/04/20   Lipid Panel   Result Value Ref Range    Total Cholesterol 153 0 - 200 mg/dL    Triglycerides 143 0 - 150 mg/dL    HDL Cholesterol 31 (L) 40 - 60 mg/dL    VLDL Cholesterol 28.6 mg/dL    LDL Cholesterol  93 0 - 100 mg/dL   Comprehensive Metabolic Panel   Result Value Ref Range    Glucose 138 (H) 65 - 99 mg/dL    BUN 14 6 - 20 mg/dL    Creatinine 1.26 0.76 - 1.27 mg/dL    eGFR Non African Am 61 >60 mL/min/1.73    eGFR African Am 73 >60 mL/min/1.73    BUN/Creatinine Ratio 11.1 7.0 - 25.0    Sodium 138 136 - 145 mmol/L    Potassium 4.4 3.5 - 5.2 mmol/L    Chloride 102 98 - 107 mmol/L    Total CO2 23.7 22.0 - 29.0 mmol/L    Calcium 8.9 8.6 - 10.5 mg/dL    Total Protein 6.5 6.0 - 8.5 g/dL    Albumin 4.40 3.50 - 5.20 g/dL    Globulin 2.1 gm/dL    A/G Ratio 2.1 g/dL    Total Bilirubin 1.0 0.2 - 1.2 mg/dL    Alkaline Phosphatase 64 39 - 117 U/L    AST (SGOT) 22 1 - 40 U/L    ALT (SGPT) 25 1 - 41 U/L   CBC (No Diff)   Result Value Ref Range    WBC 7.17 3.40 - 10.80 10*3/mm3    RBC 5.89 (H) 4.14 - 5.80 10*6/mm3    Hemoglobin 18.9 (H) 13.0 - 17.7 g/dL    Hematocrit 53.5 (H) 37.5 - 51.0 %    MCV 90.8 79.0 - 97.0 fL    MCH 32.1  26.6 - 33.0 pg    MCHC 35.3 31.5 - 35.7 g/dL    RDW 12.9 12.3 - 15.4 %    Platelets 182 140 - 450 10*3/mm3   TSH   Result Value Ref Range    TSH 4.270 (H) 0.270 - 4.200 uIU/mL   PSA Screen   Result Value Ref Range    PSA 0.444 0.000 - 4.000 ng/mL   Unable To Void   Result Value Ref Range    Unable to Void Comment    Hemoglobin A1c   Result Value Ref Range    Hemoglobin A1C 5.55 4.80 - 5.60 %   Written Authorization   Result Value Ref Range    Written Authorization Comment      Assessment/Plan   Healthy male exam.  Robert was seen today for annual exam and hypothyroidism.    Diagnoses and all orders for this visit:    Routine adult health maintenance    Acquired hypothyroidism  -     TSH; Future  -     T4, Free; Future  -     T3; Future    Screening for prostate cancer  -     PSA Screen; Future    Testosterone deficiency  -     Testosterone Cypionate (DEPOTESTOTERONE CYPIONATE) 200 MG/ML injection; Inject 1 mL into the appropriate muscle as directed by prescriber Every 7 (Seven) Days.  -     Testosterone, Free, Total; Future    Screening for colon cancer  -     Cologuard - Stool, Per Rectum; Future    Chronic seasonal allergic rhinitis due to pollen  -     fluticasone (FLONASE) 50 MCG/ACT nasal spray; 1 spray into the nostril(s) as directed by provider 2 (Two) Times a Day As Needed for Rhinitis or Allergies.    Elevated fasting glucose  -     Hemoglobin A1c; Future    Elevated red blood cell count  -     Hemoglobin & Hematocrit, Blood; Future      1. Sugar up some, A1c ok.    2. Patient Counseling:  --Nutrition: Stressed importance of moderation in sodium/caffeine intake, saturated fat and cholesterol.  Discussed caloric balance, sufficient intake of fresh fruits, vegetables, fiber, calcium, iron.  --  --Exercise: Stressed the importance of regular exercise.   --Substance Abuse: Discussed cessation/primary prevention of tobacco, alcohol, or other drug use; driving or other dangerous activities under the influence.     --Dental health: Discussed importance of regular tooth brushing, flossing, and dental visits.  -- suggested having eyes and vision checked if needed or past due.  --Immunizations reviewed.  --Discussed benefits of screening colonoscopy.consents for colo Guard.    3. Discussed the patient's BMI with him.  The BMI is above average; BMI management plan is completed  4. Follow up in 6 months    There are no Patient Instructions on file for this visit.    Medications Discontinued During This Encounter   Medication Reason   • fluticasone (FLONASE) 50 MCG/ACT nasal spray Reorder   • Testosterone Cypionate (DEPOTESTOTERONE CYPIONATE) 200 MG/ML injection Reorder        Dr. Contreras Gonzales MD  [place], [place]  [facility]

## 2020-03-19 RX ORDER — MELOXICAM 15 MG/1
15 TABLET ORAL DAILY PRN
Qty: 30 TABLET | Refills: 3 | Status: SHIPPED | OUTPATIENT
Start: 2020-03-19 | End: 2020-07-22

## 2020-04-29 DIAGNOSIS — E34.9 TESTOSTERONE DEFICIENCY: ICD-10-CM

## 2020-06-05 ENCOUNTER — OFFICE VISIT (OUTPATIENT)
Dept: SPORTS MEDICINE | Facility: CLINIC | Age: 50
End: 2020-06-05

## 2020-06-05 VITALS
SYSTOLIC BLOOD PRESSURE: 132 MMHG | HEIGHT: 77 IN | RESPIRATION RATE: 16 BRPM | BODY MASS INDEX: 37.19 KG/M2 | HEART RATE: 62 BPM | WEIGHT: 315 LBS | DIASTOLIC BLOOD PRESSURE: 76 MMHG | TEMPERATURE: 97.2 F | OXYGEN SATURATION: 98 %

## 2020-06-05 DIAGNOSIS — M25.562 ACUTE PAIN OF LEFT KNEE: Primary | ICD-10-CM

## 2020-06-05 DIAGNOSIS — S83.422A SPRAIN OF LATERAL COLLATERAL LIGAMENT OF LEFT KNEE, INITIAL ENCOUNTER: ICD-10-CM

## 2020-06-05 DIAGNOSIS — M17.0 BILATERAL PRIMARY OSTEOARTHRITIS OF KNEE: ICD-10-CM

## 2020-06-05 PROCEDURE — 73562 X-RAY EXAM OF KNEE 3: CPT | Performed by: FAMILY MEDICINE

## 2020-06-05 PROCEDURE — 99214 OFFICE O/P EST MOD 30 MIN: CPT | Performed by: FAMILY MEDICINE

## 2020-06-05 NOTE — PROGRESS NOTES
"Robert is a 50 y.o. year old male    Chief Complaint   Patient presents with   • Knee Pain     Left knee pain for a week.        History of Present Illness  New injury.  He does have known arthritis of the left knee but his pain feels different in nature.  Last week, was doing a band stretch where he brought his left foot across his body.  Felt immediate pain along the outside of his knee.  Has not had any swelling or bruising.  Has tried topical compound cream, meloxicam.  Has also been wearing compression knee sleeve for comfort.    Additionally, he is contemplating his options regarding bilateral knee arthritis.  He has lost weight recently.  Responded to cortisone injections but this was only 8 weeks of relief.    I have reviewed the patient's medical, family, and social history in detail and updated the computerized patient record.    Review of Systems  Constitutional: Negative for fever.   Musculoskeletal:        Per HPI   Skin: Negative for rash.   Neurological: Negative for weakness and numbness.   Psychiatric/Behavioral: Negative for sleep disturbance.   All other systems reviewed and are negative.    /76 (BP Location: Left arm, Patient Position: Sitting, Cuff Size: Adult)   Pulse 62   Temp 97.2 °F (36.2 °C)   Resp 16   Ht 195.6 cm (77\")   Wt (!) 163 kg (359 lb)   SpO2 98%   BMI 42.57 kg/m²      Physical Exam    Vital signs reviewed.   General: No acute distress.  Eyes: conjunctiva clear; pupils equally round and reactive  ENT: external ears and nose atraumatic; oropharynx clear  CV: no peripheral edema, 2+ distal pulses  Resp: normal respiratory effort, no use of accessory muscles  Skin: no rashes or wounds; normal turgor  Psych: mood and affect appropriate; recent and remote memory intact  Neuro: sensation to light touch intact    MSK Exam:  Left knee: No effusion.  Full range of motion.  There is tenderness along the lateral joint line.  No varus or valgus laxity.  Negative anterior Lachman. "  Negative medial, lateral Allie.    Left Knee X-Ray  Indication: Pain    Views: AP, lateral, sunrise    Findings:  No fracture  No bony lesion  Normal soft tissues  Medial joint OA    Prior studies were available for comparison.    Diagnoses and all orders for this visit:    Acute pain of left knee  -     XR Knee 3+ View With Sunrise Left    Sprain of lateral collateral ligament of left knee, initial encounter    Bilateral primary osteoarthritis of knee      1, 2.  X-ray findings discussed with patient.  Minimal change when compared to prior studies.  Symptoms today are more consistent with LCL sprain.  He has hinged knee brace at home.  Can continue conservative measures.  We did discuss PRP.  Cannot fully exclude lateral meniscus tear.  3.  Had lengthy discussion regarding nonoperative and operative treatment options.    EMR Dragon/Transcription disclaimer:    Much of this encounter note is an electronic transcription/translation of spoken language to printed text.  The electronic translation of spoken language may permit erroneous, or at times, nonsensical words or phrases to be inadvertently transcribed.  Although I have reviewed the note for such errors some may still exist.

## 2020-07-06 ENCOUNTER — OFFICE VISIT (OUTPATIENT)
Dept: SPORTS MEDICINE | Facility: CLINIC | Age: 50
End: 2020-07-06

## 2020-07-06 VITALS
RESPIRATION RATE: 16 BRPM | WEIGHT: 315 LBS | SYSTOLIC BLOOD PRESSURE: 138 MMHG | HEART RATE: 81 BPM | DIASTOLIC BLOOD PRESSURE: 66 MMHG | TEMPERATURE: 97.6 F | OXYGEN SATURATION: 98 % | HEIGHT: 77 IN | BODY MASS INDEX: 37.19 KG/M2

## 2020-07-06 DIAGNOSIS — M17.12 ARTHRITIS OF LEFT KNEE: Primary | ICD-10-CM

## 2020-07-06 DIAGNOSIS — M17.11 ARTHRITIS OF RIGHT KNEE: ICD-10-CM

## 2020-07-06 PROCEDURE — 20610 DRAIN/INJ JOINT/BURSA W/O US: CPT | Performed by: FAMILY MEDICINE

## 2020-07-06 PROCEDURE — 99213 OFFICE O/P EST LOW 20 MIN: CPT | Performed by: FAMILY MEDICINE

## 2020-07-06 RX ORDER — METHYLPREDNISOLONE ACETATE 80 MG/ML
160 INJECTION, SUSPENSION INTRA-ARTICULAR; INTRALESIONAL; INTRAMUSCULAR; SOFT TISSUE ONCE
Status: COMPLETED | OUTPATIENT
Start: 2020-07-06 | End: 2020-07-06

## 2020-07-06 RX ADMIN — METHYLPREDNISOLONE ACETATE 160 MG: 80 INJECTION, SUSPENSION INTRA-ARTICULAR; INTRALESIONAL; INTRAMUSCULAR; SOFT TISSUE at 12:03

## 2020-07-06 NOTE — PROGRESS NOTES
"Robert is a 50 y.o. year old male follow up on a problem familiar to this examiner.     Chief Complaint   Patient presents with   • Follow-up     Bilateral knee       History of Present Illness   HPI   Patient with a history of bone-on-bone medial knee arthritis.  Last injection of corticosteroid was in January this year.  His knees have begun to bother him again over the past few weeks.  Occasional swelling, no locking or giving way.  He would also like to consider evaluation by orthopedic surgeon.    I have reviewed the patient's medical, family, and social history in detail and updated the computerized patient record.    Review of Systems   Constitutional: Negative for fever.   Musculoskeletal:        Per HPI   Skin: Negative for wound.   Neurological: Negative for numbness.   All other systems reviewed and are negative.      /66 (BP Location: Right arm, Patient Position: Sitting, Cuff Size: Large Adult)   Pulse 81   Temp 97.6 °F (36.4 °C)   Resp 16   Ht 195.6 cm (77.01\")   Wt (!) 163 kg (359 lb 5.6 oz)   SpO2 98%   BMI 42.60 kg/m²      Physical Exam   Constitutional: He is oriented to person, place, and time. He appears well-developed and well-nourished.   HENT:   Head: Normocephalic and atraumatic.   Eyes: Pupils are equal, round, and reactive to light. Conjunctivae and EOM are normal.   Cardiovascular:   No peripheral edema   Pulmonary/Chest: Effort normal.   Musculoskeletal:   Bilateral knee arthrosis.  No effusion erythema.  Negative Allie.   Neurological: He is alert and oriented to person, place, and time.   Skin: Skin is warm and dry.   Psychiatric: He has a normal mood and affect. His behavior is normal.   Vitals reviewed.  Knee Injection Procedure Note    Bilateral knee injection was discussed with the patient in detail, including indication, risks, benefits, and alternatives. Verbal consent was given for the procedure. Injection was performed by MD.  Injection site was identified by " "physical examination and cleaned with Betadine and alcohol swabs. Prior to needle insertion, ethyl chloride spray was used for surface anesthesia. Sterile technique was used.  A 25-gauge, 1.5\" needle was directed to the joint from a(n) lateral and anterior approach. Injectate was passed into the joint space without difficulty. The needle was removed and a simple bandage was applied. The procedure was tolerated well without difficulty.    Injection mixture:  1% lidocaine without epinephrine: 3 mL each    Depo-Medrol 80 mg/mL: 1 mL each          Current Outpatient Medications:   •  Cholecalciferol ( VITAMIN D3) 1000 UNITS tablet, Take 1 tablet by mouth daily., Disp: , Rfl:   •  diclofenac (VOLTAREN) 1 % gel gel, Apply 4 g topically to the appropriate area as directed 4 (Four) Times a Day As Needed (arthritis)., Disp: 600 g, Rfl: 11  •  fluticasone (FLONASE) 50 MCG/ACT nasal spray, 1 spray into the nostril(s) as directed by provider 2 (Two) Times a Day As Needed for Rhinitis or Allergies., Disp: 1 bottle, Rfl: 5  •  Magnesium 200 MG chewable tablet, Chew., Disp: , Rfl:   •  meloxicam (MOBIC) 15 MG tablet, TAKE 1 TABLET BY MOUTH DAILY AS NEEDED (LEG PAIN)., Disp: 30 tablet, Rfl: 3  •  Omega-3 Fatty Acids (FISH OIL) 1000 MG capsule capsule, Take 1 capsule by mouth daily., Disp: , Rfl:   •  Syringe/Needle, Disp, (BD Integra Syringe) 25G X 1\" 3 ML misc, Inject 1 Units into the appropriate muscle as directed by prescriber 1 (One) Time Per Week., Disp: 50 each, Rfl: 0  •  Testosterone Cypionate (DEPOTESTOTERONE CYPIONATE) 200 MG/ML injection, Inject 1 mL into the appropriate muscle as directed by prescriber Every 7 (Seven) Days., Disp: 4 mL, Rfl: 5  •  Thyroid (NP THYROID) 60 MG PO tablet, Take 1 tablet by mouth Daily., Disp: 90 tablet, Rfl: 3  No current facility-administered medications for this visit.      Diagnoses and all orders for this visit:    Arthritis of left knee  -     Ambulatory Referral to Orthopedic " Surgery  -     methylPREDNISolone acetate (DEPO-medrol) injection 160 mg    Arthritis of right knee  -     Ambulatory Referral to Orthopedic Surgery  -     methylPREDNISolone acetate (DEPO-medrol) injection 160 mg       Postinjection instructions given regarding ice and activity.  Gave patient disc with his previous x-rays to take to orthopedic visit.      EMR Dragon/Transcription disclaimer:    Much of this encounter note is an electronic transcription/translation of spoken language to printed text.  The electronic translation of spoken language may permit erroneous, or at times, nonsensical words or phrases to be inadvertently transcribed.  Although I have reviewed the note for such errors some may still exist.

## 2020-07-22 RX ORDER — MELOXICAM 15 MG/1
15 TABLET ORAL DAILY PRN
Qty: 30 TABLET | Refills: 3 | Status: SHIPPED | OUTPATIENT
Start: 2020-07-22 | End: 2020-12-21

## 2020-09-01 ENCOUNTER — PATIENT MESSAGE (OUTPATIENT)
Dept: FAMILY MEDICINE CLINIC | Facility: CLINIC | Age: 50
End: 2020-09-01

## 2020-09-01 DIAGNOSIS — M25.561 CHRONIC PAIN OF BOTH KNEES: Primary | ICD-10-CM

## 2020-09-01 DIAGNOSIS — G89.29 CHRONIC PAIN OF BOTH KNEES: Primary | ICD-10-CM

## 2020-09-01 DIAGNOSIS — M25.562 CHRONIC PAIN OF BOTH KNEES: Primary | ICD-10-CM

## 2020-09-01 NOTE — TELEPHONE ENCOUNTER
From: Robert Harvey  To: Contreras Gonzales MD  Sent: 9/1/2020 4:46 AM EDT  Subject: Referral Request    I would like to get a referral to Dr Ricky Gracia for knee surgery

## 2020-09-16 ENCOUNTER — RESULTS ENCOUNTER (OUTPATIENT)
Dept: FAMILY MEDICINE CLINIC | Facility: CLINIC | Age: 50
End: 2020-09-16

## 2020-09-16 DIAGNOSIS — E03.9 ACQUIRED HYPOTHYROIDISM: ICD-10-CM

## 2020-09-16 DIAGNOSIS — Z12.5 SCREENING FOR PROSTATE CANCER: ICD-10-CM

## 2020-09-16 DIAGNOSIS — E34.9 TESTOSTERONE DEFICIENCY: ICD-10-CM

## 2020-09-16 DIAGNOSIS — R71.8 ELEVATED RED BLOOD CELL COUNT: ICD-10-CM

## 2020-09-16 DIAGNOSIS — R73.01 ELEVATED FASTING GLUCOSE: ICD-10-CM

## 2020-09-21 DIAGNOSIS — J30.1 CHRONIC SEASONAL ALLERGIC RHINITIS DUE TO POLLEN: ICD-10-CM

## 2020-09-21 RX ORDER — FLUTICASONE PROPIONATE 50 MCG
SPRAY, SUSPENSION (ML) NASAL
Qty: 48 ML | Refills: 1 | Status: SHIPPED | OUTPATIENT
Start: 2020-09-21 | End: 2021-06-09 | Stop reason: SDUPTHER

## 2020-09-23 DIAGNOSIS — E34.9 TESTOSTERONE DEFICIENCY: ICD-10-CM

## 2020-09-23 RX ORDER — TESTOSTERONE CYPIONATE 200 MG/ML
200 INJECTION, SOLUTION INTRAMUSCULAR
Qty: 4 ML | Refills: 5 | Status: CANCELLED | OUTPATIENT
Start: 2020-09-23

## 2020-09-23 RX ORDER — TESTOSTERONE CYPIONATE 200 MG/ML
INJECTION, SOLUTION INTRAMUSCULAR
Qty: 4 ML | Refills: 5 | Status: SHIPPED | OUTPATIENT
Start: 2020-09-23 | End: 2020-10-05 | Stop reason: SDUPTHER

## 2020-09-24 LAB
HBA1C MFR BLD: 5.2 % (ref 4.8–5.6)
HCT VFR BLD AUTO: 55.4 % (ref 37.5–51)
HGB BLD-MCNC: 18.8 G/DL (ref 13–17.7)
PSA SERPL-MCNC: 0.41 NG/ML (ref 0–4)
T3 SERPL-MCNC: 143 NG/DL (ref 80–200)
T4 FREE SERPL-MCNC: 1.28 NG/DL (ref 0.93–1.7)
TESTOST FREE SERPL-MCNC: 18.1 PG/ML (ref 7.2–24)
TESTOST SERPL-MCNC: 532 NG/DL (ref 264–916)
TSH SERPL DL<=0.005 MIU/L-ACNC: 3.28 UIU/ML (ref 0.27–4.2)

## 2020-09-28 DIAGNOSIS — E03.9 ACQUIRED HYPOTHYROIDISM: ICD-10-CM

## 2020-09-28 RX ORDER — LEVOTHYROXINE AND LIOTHYRONINE 38; 9 UG/1; UG/1
60 TABLET ORAL DAILY
Qty: 90 TABLET | Refills: 0 | Status: SHIPPED | OUTPATIENT
Start: 2020-09-28 | End: 2020-10-12 | Stop reason: SDUPTHER

## 2020-10-05 DIAGNOSIS — E34.9 TESTOSTERONE DEFICIENCY: ICD-10-CM

## 2020-10-05 RX ORDER — TESTOSTERONE CYPIONATE 200 MG/ML
200 INJECTION, SOLUTION INTRAMUSCULAR
Qty: 5 ML | Refills: 5 | Status: SHIPPED | OUTPATIENT
Start: 2020-10-05 | End: 2020-10-12 | Stop reason: SDUPTHER

## 2020-10-12 ENCOUNTER — OFFICE VISIT (OUTPATIENT)
Dept: SPORTS MEDICINE | Facility: CLINIC | Age: 50
End: 2020-10-12

## 2020-10-12 ENCOUNTER — OFFICE VISIT (OUTPATIENT)
Dept: FAMILY MEDICINE CLINIC | Facility: CLINIC | Age: 50
End: 2020-10-12

## 2020-10-12 VITALS
HEIGHT: 77 IN | RESPIRATION RATE: 16 BRPM | SYSTOLIC BLOOD PRESSURE: 146 MMHG | TEMPERATURE: 97.9 F | DIASTOLIC BLOOD PRESSURE: 90 MMHG | OXYGEN SATURATION: 98 % | HEART RATE: 81 BPM | WEIGHT: 315 LBS | BODY MASS INDEX: 37.19 KG/M2

## 2020-10-12 VITALS
TEMPERATURE: 96.9 F | BODY MASS INDEX: 37.19 KG/M2 | SYSTOLIC BLOOD PRESSURE: 150 MMHG | WEIGHT: 315 LBS | HEIGHT: 77 IN | HEART RATE: 73 BPM | OXYGEN SATURATION: 98 % | DIASTOLIC BLOOD PRESSURE: 90 MMHG

## 2020-10-12 DIAGNOSIS — E34.9 TESTOSTERONE DEFICIENCY: ICD-10-CM

## 2020-10-12 DIAGNOSIS — L74.0 HEAT RASH: ICD-10-CM

## 2020-10-12 DIAGNOSIS — D75.1 SECONDARY ERYTHROCYTOSIS: ICD-10-CM

## 2020-10-12 DIAGNOSIS — E03.9 ACQUIRED HYPOTHYROIDISM: Primary | ICD-10-CM

## 2020-10-12 DIAGNOSIS — M17.12 ARTHRITIS OF LEFT KNEE: Primary | ICD-10-CM

## 2020-10-12 DIAGNOSIS — Z12.5 SCREENING FOR PROSTATE CANCER: ICD-10-CM

## 2020-10-12 DIAGNOSIS — R73.01 ELEVATED FASTING GLUCOSE: ICD-10-CM

## 2020-10-12 DIAGNOSIS — Z51.81 MEDICATION MONITORING ENCOUNTER: ICD-10-CM

## 2020-10-12 PROCEDURE — 99213 OFFICE O/P EST LOW 20 MIN: CPT | Performed by: FAMILY MEDICINE

## 2020-10-12 PROCEDURE — 20610 DRAIN/INJ JOINT/BURSA W/O US: CPT | Performed by: FAMILY MEDICINE

## 2020-10-12 RX ORDER — NYSTATIN 100000 [USP'U]/G
POWDER TOPICAL 2 TIMES DAILY
Qty: 60 G | Refills: 5 | Status: SHIPPED | OUTPATIENT
Start: 2020-10-12

## 2020-10-12 RX ORDER — TRIAMCINOLONE ACETONIDE 40 MG/ML
40 INJECTION, SUSPENSION INTRA-ARTICULAR; INTRAMUSCULAR ONCE
Status: COMPLETED | OUTPATIENT
Start: 2020-10-12 | End: 2020-10-12

## 2020-10-12 RX ORDER — TESTOSTERONE CYPIONATE 200 MG/ML
200 INJECTION, SOLUTION INTRAMUSCULAR
Qty: 4 ML | Refills: 5 | Status: SHIPPED | OUTPATIENT
Start: 2020-10-12 | End: 2021-06-09 | Stop reason: SDUPTHER

## 2020-10-12 RX ORDER — LEVOTHYROXINE AND LIOTHYRONINE 38; 9 UG/1; UG/1
60 TABLET ORAL DAILY
Qty: 90 TABLET | Refills: 3 | Status: SHIPPED | OUTPATIENT
Start: 2020-10-12 | End: 2021-06-09 | Stop reason: SDUPTHER

## 2020-10-12 RX ADMIN — TRIAMCINOLONE ACETONIDE 40 MG: 40 INJECTION, SUSPENSION INTRA-ARTICULAR; INTRAMUSCULAR at 10:28

## 2020-10-12 NOTE — PROGRESS NOTES
Subjective   Robert Harvey is a 50 y.o. male who is here for   Chief Complaint   Patient presents with   • discuss labs   • Hypothyroidism   • Knee Pain   .     History of Present Illness   Hypothyroidism: Patient presents for evaluation of thyroid function. Symptoms consist of denies fatigue, weight changes, heat/cold intolerance, bowel/skin changes or CVS symptoms. Symptoms have present for 10 years. The symptoms are mild.  The problem has been completely resolved.  Previous thyroid studies include TSH and total T4. The hypothyroidism is due to hypothyroidism.    Headed for a knee replacement.    Itchy skin rash under knee brace    Has increased his depo T use.  Trying to loose weight with keto diet          The following portions of the patient's history were reviewed and updated as appropriate: allergies, current medications, past family history, past medical history, past social history, past surgical history and problem list.    Review of Systems    Objective   Physical Exam  Vitals signs and nursing note reviewed.   Cardiovascular:      Rate and Rhythm: Normal rate.   Pulmonary:      Effort: Pulmonary effort is normal.   Neurological:      General: No focal deficit present.      Mental Status: He is alert.   Psychiatric:         Mood and Affect: Mood normal.         Assessment/Plan   Robert was seen today for discuss labs, hypothyroidism and knee pain.    Diagnoses and all orders for this visit:    Acquired hypothyroidism  -     Thyroid (NP THYROID) 60 MG PO tablet; Take 1 tablet by mouth Daily.  -     Lipid Panel; Future  -     TSH; Future  -     T4, Free; Future  -     T3; Future    Heat rash  -     nystatin (MYCOSTATIN) 483368 UNIT/GM powder; Apply  topically to the appropriate area as directed 2 (Two) Times a Day.    Testosterone deficiency  -     Testosterone Cypionate (DEPOTESTOTERONE CYPIONATE) 200 MG/ML injection; Inject 1 mL into the appropriate muscle as directed by prescriber Every 7 (Seven)  Days.    Elevated fasting glucose  -     Comprehensive Metabolic Panel; Future  -     Urinalysis With Microscopic If Indicated (No Culture) - Urine, Clean Catch; Future  -     Hemoglobin A1c; Future    Medication monitoring encounter    Secondary erythrocytosis  -     CBC (No Diff); Future    Screening for prostate cancer  -     PSA Screen; Future      Patient Instructions     Results for orders placed or performed in visit on 09/16/20   Testosterone, Free, Total    Specimen: Blood   Result Value Ref Range    Testosterone, Total 532 264 - 916 ng/dL    Testosterone, Free 18.1 7.2 - 24.0 pg/mL   TSH    Specimen: Blood   Result Value Ref Range    TSH 3.280 0.270 - 4.200 uIU/mL   T4, Free    Specimen: Blood   Result Value Ref Range    Free T4 1.28 0.93 - 1.70 ng/dL   T3    Specimen: Blood   Result Value Ref Range    T3, Total 143.0 80.0 - 200.0 ng/dl   PSA Screen    Specimen: Blood   Result Value Ref Range    PSA 0.405 0.000 - 4.000 ng/mL   Hemoglobin & Hematocrit, Blood    Specimen: Blood   Result Value Ref Range    Hemoglobin 18.8 (H) 13.0 - 17.7 g/dL    Hematocrit 55.4 (H) 37.5 - 51.0 %   Hemoglobin A1c    Specimen: Blood   Result Value Ref Range    Hemoglobin A1C 5.20 4.80 - 5.60 %           Medications Discontinued During This Encounter   Medication Reason   • Testosterone Cypionate (DEPOTESTOTERONE CYPIONATE) 200 MG/ML injection Reorder   • Thyroid (NP THYROID) 60 MG PO tablet Reorder        No follow-ups on file.    Dr. Contreras Gonzales  [place], [place]

## 2020-10-12 NOTE — PATIENT INSTRUCTIONS
Results for orders placed or performed in visit on 09/16/20   Testosterone, Free, Total    Specimen: Blood   Result Value Ref Range    Testosterone, Total 532 264 - 916 ng/dL    Testosterone, Free 18.1 7.2 - 24.0 pg/mL   TSH    Specimen: Blood   Result Value Ref Range    TSH 3.280 0.270 - 4.200 uIU/mL   T4, Free    Specimen: Blood   Result Value Ref Range    Free T4 1.28 0.93 - 1.70 ng/dL   T3    Specimen: Blood   Result Value Ref Range    T3, Total 143.0 80.0 - 200.0 ng/dl   PSA Screen    Specimen: Blood   Result Value Ref Range    PSA 0.405 0.000 - 4.000 ng/mL   Hemoglobin & Hematocrit, Blood    Specimen: Blood   Result Value Ref Range    Hemoglobin 18.8 (H) 13.0 - 17.7 g/dL    Hematocrit 55.4 (H) 37.5 - 51.0 %   Hemoglobin A1c    Specimen: Blood   Result Value Ref Range    Hemoglobin A1C 5.20 4.80 - 5.60 %

## 2020-10-12 NOTE — PROGRESS NOTES
"Robert is a 50 y.o. year old male follow up on a problem familiar to this examiner.     Chief Complaint   Patient presents with   • Knee Pain     Wants cortisone injection        History of Present Illness   HPI   Patient is planning for a partial knee replacement Dr. Gracia in January 2021.  He can no longer have corticosteroid injections in the right knee.  Patient is going on a 4-hour flight to see his father in [place] this week and also going to [place] week after, left knee has been acting up and would like to have a corticosteroid injection prior to that flight and trip if possible.  He is not having any locking or giving way.  His last injection was 3 months ago.    I have reviewed the patient's medical, family, and social history in detail and updated the computerized patient record.    Review of Systems   Constitutional: Negative for fever.   Musculoskeletal:        Per HPI   Skin: Negative for wound.   Neurological: Negative for numbness.   All other systems reviewed and are negative.      /90 (BP Location: Left arm, Patient Position: Sitting, Cuff Size: Adult)   Pulse 81   Temp 97.9 °F (36.6 °C)   Resp 16   Ht 195.6 cm (77.01\")   Wt (!) 157 kg (346 lb)   SpO2 98%   BMI 41.02 kg/m²      Physical Exam  Vitals signs reviewed.   Constitutional:       Appearance: He is well-developed.   HENT:      Head: Normocephalic and atraumatic.   Eyes:      Conjunctiva/sclera: Conjunctivae normal.      Pupils: Pupils are equal, round, and reactive to light.   Cardiovascular:      Comments: No peripheral edema  Pulmonary:      Effort: Pulmonary effort is normal.   Musculoskeletal:      Comments: Left knee with medial joint arthrosis.  No effusion erythema.   Skin:     General: Skin is warm and dry.   Neurological:      Mental Status: He is alert and oriented to person, place, and time.   Psychiatric:         Behavior: Behavior normal.     Knee Injection Procedure Note    Left knee injection was " "discussed with the patient in detail, including indication, risks, benefits, and alternatives. Verbal consent was given for the procedure. Injection was performed by MD.  Injection site was identified by physical examination and cleaned with Betadine and alcohol swabs. Prior to needle insertion, ethyl chloride spray was used for surface anesthesia. Sterile technique was used.  A 25-gauge, 1.5\" needle was directed to the joint from a(n) lateral and anterior approach. Injectate was passed into the joint space without difficulty. The needle was removed and a simple bandage was applied. The procedure was tolerated well without difficulty.    Injection mixture:  1% lidocaine without epinephrine: 3 mL    40 mg/mL triamcinolone acetonide: 1 mL        Current Outpatient Medications:   •  Cholecalciferol (SM VITAMIN D3) 1000 UNITS tablet, Take 1 tablet by mouth daily., Disp: , Rfl:   •  diclofenac (VOLTAREN) 1 % gel gel, Apply 4 g topically to the appropriate area as directed 4 (Four) Times a Day As Needed (arthritis)., Disp: 600 g, Rfl: 11  •  fluticasone (FLONASE) 50 MCG/ACT nasal spray, 1 SPRAY INTO THE NOSTRIL(S) AS DIRECTED BY PROVIDER 2 TIMES A DAY AS NEEDED FOR RHINITIS OR ALLERGIES, Disp: 48 mL, Rfl: 1  •  Magnesium 200 MG chewable tablet, Chew., Disp: , Rfl:   •  meloxicam (MOBIC) 15 MG tablet, TAKE 1 TABLET BY MOUTH DAILY AS NEEDED (LEG PAIN)., Disp: 30 tablet, Rfl: 3  •  nystatin (MYCOSTATIN) 422090 UNIT/GM powder, Apply  topically to the appropriate area as directed 2 (Two) Times a Day., Disp: 60 g, Rfl: 5  •  Omega-3 Fatty Acids (FISH OIL) 1000 MG capsule capsule, Take 1 capsule by mouth daily., Disp: , Rfl:   •  Syringe/Needle, Disp, (BD Integra Syringe) 25G X 1\" 3 ML misc, Inject 1 Units into the appropriate muscle as directed by prescriber 1 (One) Time Per Week., Disp: 50 each, Rfl: 0  •  Testosterone Cypionate (DEPOTESTOTERONE CYPIONATE) 200 MG/ML injection, Inject 1 mL into the appropriate muscle as " directed by prescriber Every 7 (Seven) Days., Disp: 4 mL, Rfl: 5  •  Thyroid (NP THYROID) 60 MG PO tablet, Take 1 tablet by mouth Daily., Disp: 90 tablet, Rfl: 3    Current Facility-Administered Medications:   •  triamcinolone acetonide (KENALOG-40) injection 40 mg, 40 mg, Intra-articular, Once, Keith Fonseca MD     Diagnoses and all orders for this visit:    Arthritis of left knee  -     triamcinolone acetonide (KENALOG-40) injection 40 mg       Postinjection instructions given guarding ice and activity.  Patient will plan to have the left knee done within the next 9 to 12 months.        EMR Dragon/Transcription disclaimer:    Much of this encounter note is an electronic transcription/translation of spoken language to printed text.  The electronic translation of spoken language may permit erroneous, or at times, nonsensical words or phrases to be inadvertently transcribed.  Although I have reviewed the note for such errors some may still exist.

## 2020-12-21 RX ORDER — MELOXICAM 15 MG/1
15 TABLET ORAL DAILY PRN
Qty: 30 TABLET | Refills: 3 | Status: SHIPPED | OUTPATIENT
Start: 2020-12-21 | End: 2021-07-13 | Stop reason: SDUPTHER

## 2020-12-29 ENCOUNTER — TELEPHONE (OUTPATIENT)
Dept: FAMILY MEDICINE CLINIC | Facility: CLINIC | Age: 50
End: 2020-12-29

## 2020-12-29 NOTE — TELEPHONE ENCOUNTER
Ericka Rowland and Kia, , called stating pt had a total knee replacement today. He is worried about taking the hydrocodone and tramadol for pain due to him not producing t3 and t4, is there any contraindications? They asked me to call back so he can be notified if its ok.

## 2021-01-12 ENCOUNTER — TREATMENT (OUTPATIENT)
Dept: PHYSICAL THERAPY | Facility: CLINIC | Age: 51
End: 2021-01-12

## 2021-01-12 DIAGNOSIS — G89.18 ACUTE POSTOPERATIVE PAIN OF RIGHT KNEE: ICD-10-CM

## 2021-01-12 DIAGNOSIS — R26.2 DIFFICULTY WALKING DOWN STEP: ICD-10-CM

## 2021-01-12 DIAGNOSIS — R26.2 DIFFICULTY WALKING UP STEP: ICD-10-CM

## 2021-01-12 DIAGNOSIS — M25.561 ACUTE POSTOPERATIVE PAIN OF RIGHT KNEE: ICD-10-CM

## 2021-01-12 DIAGNOSIS — R26.2 DIFFICULTY WALKING DUE TO KNEE JOINT: ICD-10-CM

## 2021-01-12 DIAGNOSIS — Z96.651 S/P RIGHT UNICOMPARTMENTAL KNEE REPLACEMENT: Primary | ICD-10-CM

## 2021-01-12 PROCEDURE — 97110 THERAPEUTIC EXERCISES: CPT | Performed by: PHYSICAL THERAPIST

## 2021-01-12 PROCEDURE — 97161 PT EVAL LOW COMPLEX 20 MIN: CPT | Performed by: PHYSICAL THERAPIST

## 2021-01-12 PROCEDURE — 97014 ELECTRIC STIMULATION THERAPY: CPT | Performed by: PHYSICAL THERAPIST

## 2021-01-12 PROCEDURE — 97140 MANUAL THERAPY 1/> REGIONS: CPT | Performed by: PHYSICAL THERAPIST

## 2021-01-12 NOTE — PROGRESS NOTES
Physical Therapy Initial Evaluation and Plan of Care    Patient: Robert Harvey   : 1970  Diagnosis/ICD-10 Code:  S/P right unicompartmental knee replacement [Z96.651]  Referring practitioner: Ricky Gracia MD    Subjective Evaluation    History of Present Illness  Date of surgery: 2020  Mechanism of injury: Pt is a 51 y/o WM who reports to the clinic with S/P unicompartmental knee replacement.  Pt states his OA was on the medial right knee.  Pt had prior PT from 10/2019-2020 for right lateral leg pain.  Pt reports 8 yrs of intermittent knee pain that progressed to constant knee over the last year.  Pt received 2 cortisone injections in B knees.  Plan to get the left knee replaced next year.  Pt had right knee replaced as an outpatient-went home with home health PT 3x/week with last visit on .  Used a walker for one wk and started with cane PRN beginning of last week.         Patient Occupation: -off for 6-8 weeks  Quality of life: good    Pain  Current pain ratin  At worst pain ratin  Location: right medial knee joint, lateral   Quality: sharp and dull ache  Relieving factors: medications, change in position, ice, rest and support (hydrocodone 2-3/day)  Aggravating factors: sleeping, movement, standing and prolonged positioning (difficulty sleeping on sides, dressing LE's )    Hand dominance: right    Diagnostic Tests  X-ray: abnormal (medial knee bone on bone )  MRI studies: abnormal (OA of knee and effusion )    Treatments  Previous treatment: physical therapy and injection treatment  Current treatment: medication  Current treatment comments: anti-inflammatory .     Patient Goals  Patient goals for therapy: decreased pain, decreased edema, increased motion, increased strength, independence with ADLs/IADLs and return to work  Patient goal: wood working            Objective          Observations     Right Knee   Positive for effusion and incision.      Additional Knee Observation Details  Right anterior knee incision covered with 2-3 steri strips-incision healing well without signs of infection.  Mild observable knee effusion in right knee and suprapatellar pouch.      Palpation   Left   Tenderness of the vastus lateralis and vastus medialis.     Tenderness     Right Knee   Tenderness in the lateral joint line and medial joint line.     Additional Tenderness Details  Mild point tenderness over the right knee     Neurological Testing     Sensation     Knee   Left Knee   Intact: light touch    Right Knee   Diminished: light touch     Comments   Right light touch: L5 at the knee, lateral knee .     Active Range of Motion   Left Knee   Flexion: 129 degrees     Right Knee   Flexion: 77 degrees   Extension: 4 degrees     Additional Active Range of Motion Details  Right knee +1 HE     Patellar Mobility   Left Knee Patellar tendons within functional limits include the medial, lateral, superior and inferior.     Right Knee Patellar tendons within functional limits include the medial and lateral. Hypomobile in the superior and inferior patellar tendon(s).     Strength/Myotome Testing     Left Hip   Planes of Motion   Flexion: 5  Abduction: 4  Adduction: 4  External rotation: WFL    Right Hip   Planes of Motion   Flexion: 3  Abduction: 4  Adduction: 3    Left Knee   Flexion: 5  Extension: 5  Quadriceps contraction: good    Right Knee   Flexion: 3  Extension: 4+  Quadriceps contraction: fair    Left Ankle/Foot   Dorsiflexion: 5  Plantar flexion: 5    Right Ankle/Foot   Dorsiflexion: 5  Plantar flexion: 5    Tests     Left Hip   SLR: Negative.     Right Hip   SLR: Positive.     Right Ankle/Foot   Negative for Homans' sign.     Additional Tests Details  Minimal tightness with SLR right knee   NA S/P right knee surgery     Swelling     Left Knee Girth Measurement (cm)   Joint line: 45.5.  10 cm above joint line: 50 cm  10 cm below joint line: IP 43.5     Right Knee Girth  Measurement (cm)   Joint line: 52.5   10 cm above joint line: 6 cm from joint line 57.  10 cm below joint line: IP 49.    Ambulation     Observational Gait   Gait: antalgic   Decreased walking speed, stride length, right stance time, right swing time and right step length.   Right foot contact pattern: foot flat    Additional Observational Gait Details  Pt ambulated into clinic WBAT right LE using a single point cane mild antalgic gait.            Assessment & Plan     Assessment  Impairments: abnormal gait, abnormal or restricted ROM, impaired balance, impaired physical strength, lacks appropriate home exercise program, pain with function and weight-bearing intolerance  Assessment details: Pt is a 49 y/o WM who reports to the clinic with c/o right knee pain, decreased flexibility, tenderness, knee effusion, decreased ROM, decreased strength and decreased functional mobility with ambulation and steps secondary to having his right knee replaced.        Prognosis: good  Functional Limitations: carrying objects, sleeping, walking, uncomfortable because of pain, standing, stooping and unable to perform repetitive tasks  Goals  Plan Goals: STGs: 2-4 weeks  1. Decrease right  knee pain 4/10 with ambulation and steps.    2. Increase right knee AROM 0-110 degrees   3.  Decrease right medial and lateral knee joint tenderness to none with palpation.     4.  Increase right hamstring flexibility to WNL with SLR test    5.  Pt ambulates into clinic without an AD minimal antalgic gait right LE.    6.  Decrease right knee effusion at girth measurements by 3-4 cm    LTGs: 4-8 weeks  1.  Pt Independent with HEP.  2.  Increase right knee AROM 0-120 degrees   3.  Increase right knee flex strength to 5/5    4.  Increase right hip flex, abd, add strength to 4+-5/5.    5.  Pt ambulates right LE without an antalgic gait.    6.  Decrease right knee pain to a 1-2/10 with ambulation and steps.        Plan  Therapy options: will be seen for  skilled physical therapy services  Planned modality interventions: cryotherapy, electrical stimulation/Russian stimulation and thermotherapy (hydrocollator packs)  Planned therapy interventions: joint mobilization, home exercise program, gait training, flexibility, strengthening, stretching, functional ROM exercises, therapeutic activities and manual therapy  Treatment plan discussed with: patient  Plan details: 24 visits         Manual Therapy:    8     mins  59171;  Therapeutic Exercise:    30     mins  55310;     Neuromuscular Velia:        mins  82798;    Therapeutic Activity:          mins  50655;     Gait Training:           mins  74854;     Ultrasound:          mins  25151;    Electrical Stimulation:    15     mins  48168 ( );  Dry Needling          mins self-pay    Timed Treatment:  38    mins   Total Treatment:    74    mins    PT SIGNATURE: Melani Tamez, PT   DATE TREATMENT INITIATED: 1/12/2021    Initial Certification  Certification Period: 4/12/2021  I certify that the therapy services are furnished while this patient is under my care.  The services outlined above are required by this patient, and will be reviewed every 90 days.     PHYSICIAN: Ricky Gracia MD      DATE:     Please sign and return via fax to [phone].. Thank you, [facility].

## 2021-01-14 ENCOUNTER — TREATMENT (OUTPATIENT)
Dept: PHYSICAL THERAPY | Facility: CLINIC | Age: 51
End: 2021-01-14

## 2021-01-14 DIAGNOSIS — Z96.651 STATUS POST UNICOMPARTMENTAL KNEE REPLACEMENT, RIGHT: Primary | ICD-10-CM

## 2021-01-14 DIAGNOSIS — R26.2 DIFFICULTY WALKING UP STEP: ICD-10-CM

## 2021-01-14 DIAGNOSIS — M25.561 ACUTE POSTOPERATIVE PAIN OF RIGHT KNEE: ICD-10-CM

## 2021-01-14 DIAGNOSIS — R26.2 DIFFICULTY WALKING DOWN STEP: ICD-10-CM

## 2021-01-14 DIAGNOSIS — R26.2 DIFFICULTY WALKING DUE TO KNEE JOINT: ICD-10-CM

## 2021-01-14 DIAGNOSIS — G89.18 ACUTE POSTOPERATIVE PAIN OF RIGHT KNEE: ICD-10-CM

## 2021-01-14 PROCEDURE — 97530 THERAPEUTIC ACTIVITIES: CPT | Performed by: PHYSICAL THERAPIST

## 2021-01-14 PROCEDURE — 97014 ELECTRIC STIMULATION THERAPY: CPT | Performed by: PHYSICAL THERAPIST

## 2021-01-14 PROCEDURE — 97110 THERAPEUTIC EXERCISES: CPT | Performed by: PHYSICAL THERAPIST

## 2021-01-14 PROCEDURE — 97140 MANUAL THERAPY 1/> REGIONS: CPT | Performed by: PHYSICAL THERAPIST

## 2021-01-14 NOTE — PROGRESS NOTES
Physical Therapy Daily Progress Note    Patient: Robert Harvey   : 1970  Diagnosis/ICD-10 Code:  Status post unicompartmental knee replacement, right [Z96.651]  Referring practitioner: Ricky Gracia MD  Date of Initial Visit: Type: THERAPY  Noted: 2021  Today's Date: 2021  Patient seen for 2 sessions         Robert Harvey reports: Pt reports increased mobility and reduced stiffness in his right surgical knee, but continued reports of pain.       Subjective     Objective          Active Range of Motion     Right Knee   Flexion: 89 degrees   Extension: 1 degrees     Additional Active Range of Motion Details  AAROM right knee flex 93 degrees       See Exercise, Manual, and Modality Logs for complete treatment.       Assessment & Plan     Assessment  Assessment details: Pt tolerated treatment session well. Pt had increase in knee extension on right surgical side post manual therapy and exercise. Pt demonstrated mild quad lag during SLR exercise and moderate quad fatigue during short arc quad exercise. Pt was progressed with CKC strengthening exercise in standing of step up for improved weight bearing through right knee. Marching exercises added to improve unilateral stance phase of gait and reduce Pt antalgic gait pattern on right side when ambulating without SPC. Pt educated on importance of ambulating with SPC to encourage normalized gait pattern until right knee is able to support him in stance with increased knee extensor control.        Progress per Plan of Care           Manual Therapy:    13     mins  55504;  Therapeutic Exercise:    25     mins  54328;     Neuromuscular Velia:        mins  86714;    Therapeutic Activity:      8    mins  93441;     Gait Training:           mins  97990;     Ultrasound:          mins  34921;    Electrical Stimulation:    15     mins  86061 ( );  Dry Needling          mins self-pay    Timed Treatment:   46   mins   Total Treatment:     69   mins    Pavel  Tremayne Student PT    Melani Tamez, PT  Physical Therapist

## 2021-01-15 ENCOUNTER — TREATMENT (OUTPATIENT)
Dept: PHYSICAL THERAPY | Facility: CLINIC | Age: 51
End: 2021-01-15

## 2021-01-15 DIAGNOSIS — G89.18 ACUTE POSTOPERATIVE PAIN OF RIGHT KNEE: ICD-10-CM

## 2021-01-15 DIAGNOSIS — R26.2 DIFFICULTY WALKING DOWN STEP: ICD-10-CM

## 2021-01-15 DIAGNOSIS — R26.2 DIFFICULTY WALKING UP STEP: ICD-10-CM

## 2021-01-15 DIAGNOSIS — M25.561 ACUTE POSTOPERATIVE PAIN OF RIGHT KNEE: ICD-10-CM

## 2021-01-15 DIAGNOSIS — Z96.651 STATUS POST UNICOMPARTMENTAL KNEE REPLACEMENT, RIGHT: Primary | ICD-10-CM

## 2021-01-15 DIAGNOSIS — R26.2 DIFFICULTY WALKING DUE TO KNEE JOINT: ICD-10-CM

## 2021-01-15 PROCEDURE — 97140 MANUAL THERAPY 1/> REGIONS: CPT | Performed by: PHYSICAL THERAPIST

## 2021-01-15 PROCEDURE — 97014 ELECTRIC STIMULATION THERAPY: CPT | Performed by: PHYSICAL THERAPIST

## 2021-01-15 PROCEDURE — 97530 THERAPEUTIC ACTIVITIES: CPT | Performed by: PHYSICAL THERAPIST

## 2021-01-15 PROCEDURE — 97110 THERAPEUTIC EXERCISES: CPT | Performed by: PHYSICAL THERAPIST

## 2021-01-18 ENCOUNTER — TREATMENT (OUTPATIENT)
Dept: PHYSICAL THERAPY | Facility: CLINIC | Age: 51
End: 2021-01-18

## 2021-01-18 DIAGNOSIS — Z96.651 S/P RIGHT UNICOMPARTMENTAL KNEE REPLACEMENT: ICD-10-CM

## 2021-01-18 DIAGNOSIS — M25.562 CHRONIC PAIN OF BOTH KNEES: ICD-10-CM

## 2021-01-18 DIAGNOSIS — R26.2 DIFFICULTY WALKING UP STEP: ICD-10-CM

## 2021-01-18 DIAGNOSIS — M17.12 ARTHRITIS OF KNEE, LEFT: ICD-10-CM

## 2021-01-18 DIAGNOSIS — R26.2 WALKING DIFFICULTY DUE TO LOWER LEG: ICD-10-CM

## 2021-01-18 DIAGNOSIS — Z96.651 STATUS POST UNICOMPARTMENTAL KNEE REPLACEMENT, RIGHT: Primary | ICD-10-CM

## 2021-01-18 DIAGNOSIS — R26.2 DIFFICULTY WALKING DUE TO KNEE JOINT: ICD-10-CM

## 2021-01-18 DIAGNOSIS — G89.29 CHRONIC PAIN OF BOTH KNEES: ICD-10-CM

## 2021-01-18 DIAGNOSIS — M17.11 ARTHRITIS OF KNEE, RIGHT: ICD-10-CM

## 2021-01-18 DIAGNOSIS — R26.2 DIFFICULTY WALKING DOWN STEP: ICD-10-CM

## 2021-01-18 DIAGNOSIS — M25.561 CHRONIC PAIN OF BOTH KNEES: ICD-10-CM

## 2021-01-18 DIAGNOSIS — G89.18 ACUTE POSTOPERATIVE PAIN OF RIGHT KNEE: ICD-10-CM

## 2021-01-18 DIAGNOSIS — M79.604 RIGHT LEG PAIN: ICD-10-CM

## 2021-01-18 DIAGNOSIS — M25.561 ACUTE POSTOPERATIVE PAIN OF RIGHT KNEE: ICD-10-CM

## 2021-01-18 PROCEDURE — 97110 THERAPEUTIC EXERCISES: CPT | Performed by: PHYSICAL THERAPIST

## 2021-01-18 PROCEDURE — 97014 ELECTRIC STIMULATION THERAPY: CPT | Performed by: PHYSICAL THERAPIST

## 2021-01-18 PROCEDURE — 97530 THERAPEUTIC ACTIVITIES: CPT | Performed by: PHYSICAL THERAPIST

## 2021-01-18 PROCEDURE — 97140 MANUAL THERAPY 1/> REGIONS: CPT | Performed by: PHYSICAL THERAPIST

## 2021-01-18 NOTE — PROGRESS NOTES
Physical Therapy Daily Progress Note      Patient: Robert Harvey   : 1970  Diagnosis/ICD-10 Code:  Status post unicompartmental knee replacement, right [Z96.651]  Referring practitioner: Ricky Gracia MD  Date of Initial Visit: Type: THERAPY  Noted: 2021  Today's Date: 2021  Patient seen for 4 sessions         Robert Harvey reports: Pt reports that his right knee is sore today, but states his pain is decreasing overall since his surgery. Pt doesn't cite any specific reason for his right knee being more irritated today.      Subjective     Objective          Active Range of Motion     Right Knee   Flexion: 99 degrees   Extension: 0 degrees       See Exercise, Manual, and Modality Logs for complete treatment.       Assessment & Plan     Assessment  Assessment details: Pt presents with increased irritability in his right knee on this date. IFC and cryotherapy initiated prior to exercise/manual therapy to improve tolerance and Pt reported decreases in pain post modality. Pt tolerated all manual therapy and exercises well. Pt no progressed with exercise on this date secondary to aforementioned increased irritability. Plan is to continue to see Pt 2-3x a week and continue to progress CKC strengthening of right knee, increase flexion AROM, and return Pt closer to PLOF.        Progress per Plan of Care           Manual Therapy:    14     mins  73657;  Therapeutic Exercise:    20     mins  34505;     Neuromuscular Velia:        mins  71364;    Therapeutic Activity:      10    mins  55067;     Gait Training:           mins  34234;     Ultrasound:          mins  47402;    Electrical Stimulation:    15     mins  87004 ( );  Dry Needling          mins self-pay    Timed Treatment:  44    mins   Total Treatment:      61  mins    Pavel Casper Student PT    Melani Tamez, PT  Physical Therapist

## 2021-01-19 ENCOUNTER — TREATMENT (OUTPATIENT)
Dept: PHYSICAL THERAPY | Facility: CLINIC | Age: 51
End: 2021-01-19

## 2021-01-19 DIAGNOSIS — G89.18 ACUTE POSTOPERATIVE PAIN OF RIGHT KNEE: ICD-10-CM

## 2021-01-19 DIAGNOSIS — M25.561 ACUTE POSTOPERATIVE PAIN OF RIGHT KNEE: ICD-10-CM

## 2021-01-19 DIAGNOSIS — R26.2 DIFFICULTY WALKING DOWN STEP: ICD-10-CM

## 2021-01-19 DIAGNOSIS — Z96.651 STATUS POST UNICOMPARTMENTAL KNEE REPLACEMENT, RIGHT: Primary | ICD-10-CM

## 2021-01-19 DIAGNOSIS — R26.2 DIFFICULTY WALKING UP STEP: ICD-10-CM

## 2021-01-19 DIAGNOSIS — R26.2 DIFFICULTY WALKING DUE TO KNEE JOINT: ICD-10-CM

## 2021-01-19 DIAGNOSIS — Z96.651 S/P RIGHT UNICOMPARTMENTAL KNEE REPLACEMENT: ICD-10-CM

## 2021-01-19 PROCEDURE — 97140 MANUAL THERAPY 1/> REGIONS: CPT | Performed by: PHYSICAL THERAPIST

## 2021-01-19 PROCEDURE — 97110 THERAPEUTIC EXERCISES: CPT | Performed by: PHYSICAL THERAPIST

## 2021-01-19 PROCEDURE — 97530 THERAPEUTIC ACTIVITIES: CPT | Performed by: PHYSICAL THERAPIST

## 2021-01-19 PROCEDURE — 97014 ELECTRIC STIMULATION THERAPY: CPT | Performed by: PHYSICAL THERAPIST

## 2021-01-19 NOTE — PROGRESS NOTES
Physical Therapy Daily Progress Note    Visit # : 5  Robert Harvey reports: he is feeling pretty good, but it is feeling tight today.      Subjective     Objective          Active Range of Motion     Right Knee   Flexion: 112 degrees   Extension: 0 degrees       See Exercise, Manual, and Modality Logs for complete treatment.       Assessment & Plan     Assessment  Assessment details: Pt is doing fairly well, but he is still having knee pain and tightness especially at night and first things in the mornings.  Pt's knee flex AROM continues to improve and he is tolerating all increases in strengthening exercises.  Added LAQ and leg press today.  Pt had moderate quad fatigue with SLR, SAQ and LAQ exercises.   Plan is to continue to see pt 2-3x a week and continue to progress CKC strengthening and  Stretching exercises to improve pt's functional mobility so pt can return to work.          Progress per Plan of Care           Manual Therapy:   8      mins  80671;  Therapeutic Exercise:   22      mins  72659;     Neuromuscular Velia:        mins  28694;    Therapeutic Activity:    15      mins  23511;     Gait Training:           mins  55467;     Ultrasound:          mins  72230;    Electrical Stimulation:  15       mins  27955 ( );  Dry Needling          mins self-pay    Timed Treatment:  45    mins   Total Treatment:    70    mins    Melani Tamez, PT  Physical Therapist

## 2021-01-21 ENCOUNTER — TREATMENT (OUTPATIENT)
Dept: PHYSICAL THERAPY | Facility: CLINIC | Age: 51
End: 2021-01-21

## 2021-01-21 DIAGNOSIS — R26.2 DIFFICULTY WALKING UP STEP: ICD-10-CM

## 2021-01-21 DIAGNOSIS — R26.2 DIFFICULTY WALKING DOWN STEP: ICD-10-CM

## 2021-01-21 DIAGNOSIS — R26.2 DIFFICULTY WALKING DUE TO KNEE JOINT: ICD-10-CM

## 2021-01-21 DIAGNOSIS — M25.561 ACUTE POSTOPERATIVE PAIN OF RIGHT KNEE: ICD-10-CM

## 2021-01-21 DIAGNOSIS — Z96.651 STATUS POST UNICOMPARTMENTAL KNEE REPLACEMENT, RIGHT: Primary | ICD-10-CM

## 2021-01-21 DIAGNOSIS — G89.18 ACUTE POSTOPERATIVE PAIN OF RIGHT KNEE: ICD-10-CM

## 2021-01-21 DIAGNOSIS — Z96.651 S/P RIGHT UNICOMPARTMENTAL KNEE REPLACEMENT: ICD-10-CM

## 2021-01-21 PROCEDURE — 97014 ELECTRIC STIMULATION THERAPY: CPT | Performed by: PHYSICAL THERAPIST

## 2021-01-21 PROCEDURE — 97110 THERAPEUTIC EXERCISES: CPT | Performed by: PHYSICAL THERAPIST

## 2021-01-21 PROCEDURE — 97530 THERAPEUTIC ACTIVITIES: CPT | Performed by: PHYSICAL THERAPIST

## 2021-01-21 NOTE — PROGRESS NOTES
Physical Therapy Daily Progress Note        Patient: Robert Harvey   : 1970  Diagnosis/ICD-10 Code:  Status post unicompartmental knee replacement, right [Z96.651]  Referring practitioner: Ricky Gracia MD  Date of Initial Visit: Type: THERAPY  Noted: 2021  Today's Date: 2021  Patient seen for 6 sessions         Robert Harvey reports: his knee is hurting and rated it a 3/10.  He said it is his first day without codeine.         Subjective     Objective          Active Range of Motion     Right Knee   Flexion: 98 degrees   Extension: 0 degrees     Passive Range of Motion     Right Knee   Flexion: 112 degrees     Strength/Myotome Testing     Left Knee   Flexion: 4  Extension: 4+    Ambulation   Weight-Bearing Status   Assistive device used: single point cane    Observational Gait   Gait: antalgic       See Exercise, Manual, and Modality Logs for complete treatment.       Assessment/Plan  Pt demonstrated good knee strength with MMT however continued fatigue noted with exercises through muscle trembling.  He demonstrated improved ROM since eval however some stiffness reported during the session.  He continued to amb with antalgic pattern with use of STC however able to amb short distances without AD and was able to add amb over cups to progress ROM, balance, and gait mechanics.  No LOB and improved weightshift/knee flexion noted with addition. Will continue to progress ROM, strength, and balance for continued progression towards previous level of function and return to work.      Progress per Plan of Care           Manual Therapy:    4     mins  03667;  Therapeutic Exercise:    28     mins  15977;     Neuromuscular Velia:        mins  32065;    Therapeutic Activity:   10       mins  04701;     Gait Training:           mins  25213;     Ultrasound:          mins  05054;    Electrical Stimulation:    15     mins  55314 ( );  Dry Needling          mins self-pay    Timed Treatment:   42   mins    Total Treatment:     61   mins    Lizette Aguilar PTA  Physical Therapist Assistant

## 2021-01-25 ENCOUNTER — TREATMENT (OUTPATIENT)
Dept: PHYSICAL THERAPY | Facility: CLINIC | Age: 51
End: 2021-01-25

## 2021-01-25 DIAGNOSIS — R26.2 DIFFICULTY WALKING UP STEP: ICD-10-CM

## 2021-01-25 DIAGNOSIS — M79.604 RIGHT LEG PAIN: ICD-10-CM

## 2021-01-25 DIAGNOSIS — R26.2 WALKING DIFFICULTY DUE TO LOWER LEG: ICD-10-CM

## 2021-01-25 DIAGNOSIS — M25.561 ACUTE POSTOPERATIVE PAIN OF RIGHT KNEE: ICD-10-CM

## 2021-01-25 DIAGNOSIS — Z96.651 STATUS POST UNICOMPARTMENTAL KNEE REPLACEMENT, RIGHT: Primary | ICD-10-CM

## 2021-01-25 DIAGNOSIS — R26.2 DIFFICULTY WALKING DUE TO KNEE JOINT: ICD-10-CM

## 2021-01-25 DIAGNOSIS — R26.2 DIFFICULTY WALKING DOWN STEP: ICD-10-CM

## 2021-01-25 DIAGNOSIS — G89.18 ACUTE POSTOPERATIVE PAIN OF RIGHT KNEE: ICD-10-CM

## 2021-01-25 DIAGNOSIS — Z96.651 S/P RIGHT UNICOMPARTMENTAL KNEE REPLACEMENT: ICD-10-CM

## 2021-01-25 PROCEDURE — 97110 THERAPEUTIC EXERCISES: CPT | Performed by: PHYSICAL THERAPIST

## 2021-01-25 PROCEDURE — 97014 ELECTRIC STIMULATION THERAPY: CPT | Performed by: PHYSICAL THERAPIST

## 2021-01-25 PROCEDURE — 97530 THERAPEUTIC ACTIVITIES: CPT | Performed by: PHYSICAL THERAPIST

## 2021-01-25 PROCEDURE — 97140 MANUAL THERAPY 1/> REGIONS: CPT | Performed by: PHYSICAL THERAPIST

## 2021-01-25 NOTE — PROGRESS NOTES
"   Physical Therapy Daily Progress Note    Patient: Robert Harvey   : 1970  Diagnosis/ICD-10 Code:  Status post unicompartmental knee replacement, right [Z96.651]  Referring practitioner: Ricky Gracia MD  Date of Initial Visit: Type: THERAPY  Noted: 2021  Today's Date: 2021  Patient seen for 7 sessions         Robert Harvey reports: Pt states that his knee is stiff today and his pain is at a 6-7/10 when he initially begins walking. Pt states his knee has been this way the last couple days. He states that after walking 10-15 feet his right knee will \"loosen up\" and his pain will go down to a 2-3/10        Subjective     Objective          Palpation   Left   Hypertonic in the distal biceps femoris.   Tenderness of the distal biceps femoris.     Left Knee Comments  Left distal biceps femoris: Moderate.     Active Range of Motion   Left Knee   Flexion: 118 degrees   Extension: 0 degrees       See Exercise, Manual, and Modality Logs for complete treatment.       Assessment & Plan     Assessment  Assessment details: Pt tolerated treatment well today. Pt demonstrated increased knee AROM in flexion on this date with a measurement of 118 deg. Pt had complaints of distal bicep femoris tightness/pain and was TTP in the distal muscle belly/insertion. Pt instructed in self trigger point release with lacrosse ball on bicep femoris for pain relief/decreased tone. Pt remains at terminal knee extension AROM. Pt progressed with sit to stand exercise/increased step up height to continue to improve loading of right tibiofemoral joint in increasing degrees of flexion in weightbearing. Pt progressed with SLB exercise to improve balance during stance phase of gait and decrease antalgic gait pattern on right. Pt educated on importance of following his MD's orders regarding his pain medication to ensure decreases in irritability of his right knee to reduce pain and improve tolerance for movement. Plan is to continue to " see Pt 2-3x per week to improve knee AROM, strength in right lower extremity, balance, and work towards ambulation without SPC/decrease antalgic gait on right.        Progress per Plan of Care           Manual Therapy:     10    mins  05062;  Therapeutic Exercise:      24   mins  69108;     Neuromuscular Velia:        mins  75707;    Therapeutic Activity:      24    mins  30715;     Gait Training:           mins  80968;     Ultrasound:          mins  13174;    Electrical Stimulation:     15    mins  27097 ( );  Dry Needling          mins self-pay    Timed Treatment:   58   mins   Total Treatment:     89   mins    Pavel Casper Student PT    Melani Tamez, PT  Physical Therapist

## 2021-01-28 ENCOUNTER — TREATMENT (OUTPATIENT)
Dept: PHYSICAL THERAPY | Facility: CLINIC | Age: 51
End: 2021-01-28

## 2021-01-28 DIAGNOSIS — R26.2 DIFFICULTY WALKING DUE TO KNEE JOINT: ICD-10-CM

## 2021-01-28 DIAGNOSIS — G89.18 ACUTE POSTOPERATIVE PAIN OF RIGHT KNEE: ICD-10-CM

## 2021-01-28 DIAGNOSIS — R26.2 DIFFICULTY WALKING UP STEP: ICD-10-CM

## 2021-01-28 DIAGNOSIS — Z96.651 STATUS POST UNICOMPARTMENTAL KNEE REPLACEMENT, RIGHT: Primary | ICD-10-CM

## 2021-01-28 DIAGNOSIS — M25.561 ACUTE POSTOPERATIVE PAIN OF RIGHT KNEE: ICD-10-CM

## 2021-01-28 DIAGNOSIS — R26.2 DIFFICULTY WALKING DOWN STEP: ICD-10-CM

## 2021-01-28 DIAGNOSIS — Z96.651 S/P RIGHT UNICOMPARTMENTAL KNEE REPLACEMENT: ICD-10-CM

## 2021-01-28 PROCEDURE — 97530 THERAPEUTIC ACTIVITIES: CPT | Performed by: PHYSICAL THERAPIST

## 2021-01-28 PROCEDURE — 97014 ELECTRIC STIMULATION THERAPY: CPT | Performed by: PHYSICAL THERAPIST

## 2021-01-28 PROCEDURE — 97110 THERAPEUTIC EXERCISES: CPT | Performed by: PHYSICAL THERAPIST

## 2021-01-28 NOTE — PROGRESS NOTES
Physical Therapy Daily Progress Note        Patient: Robert Harvey   : 1970  Diagnosis/ICD-10 Code:  Status post unicompartmental knee replacement, right [Z96.651]  Referring practitioner: Ricky Gracia MD  Date of Initial Visit: Type: THERAPY  Noted: 2021  Today's Date: 2021  Patient seen for 8 sessions         Robert Harvey reports: he said he is getting a lot of tightness behind his knee        Subjective     Objective   See Exercise, Manual, and Modality Logs for complete treatment.       Assessment/Plan   Pt able to add weight with hip flex and ABD however continued to demonstrate muscle fatigue/trembling with LAQ thus held weight.   Added hip ADD as predominant trembling noted on medial thigh.  Also added step downs with retro step up and single leg press to continue to progress functional strength of LE/quad.  He tolerated progressions without complaints.  Pt continued to demonstrate (R) knee ext/forward with sit to stands from low surfaces as well as increased weight shift (L) with sit to stands during treatment.  Cued to even stance and weightshift.  He also demonstrated increased hip ER with several exercises and will continue to monitor to ensure (B) LE consistent.     Educated in gastroc stretch, possibility of dry needling, and conscious correction of gait mechanics if tightness behind knee persists and continues to cause sleep disturbances..  Discussed continuing with conservative methods first and will continue to monitor.     Progress per Plan of Care           Manual Therapy:    5     mins  98733;  Therapeutic Exercise:    30     mins  56097;     Neuromuscular Velia:        mins  69456;    Therapeutic Activity:     15     mins  52437;     Gait Training:           mins  99452;     Ultrasound:          mins  24477;    Electrical Stimulation:    15     mins  61853 ( );  Dry Needling          mins self-pay    Timed Treatment:   50   mins   Total Treatment:     65    georgiana Aguilar PTA  Physical Therapist Assistant

## 2021-01-29 ENCOUNTER — TREATMENT (OUTPATIENT)
Dept: PHYSICAL THERAPY | Facility: CLINIC | Age: 51
End: 2021-01-29

## 2021-01-29 DIAGNOSIS — M25.561 ACUTE POSTOPERATIVE PAIN OF RIGHT KNEE: ICD-10-CM

## 2021-01-29 DIAGNOSIS — R26.2 DIFFICULTY WALKING UP STEP: ICD-10-CM

## 2021-01-29 DIAGNOSIS — G89.18 ACUTE POSTOPERATIVE PAIN OF RIGHT KNEE: ICD-10-CM

## 2021-01-29 DIAGNOSIS — R26.2 DIFFICULTY WALKING DOWN STEP: ICD-10-CM

## 2021-01-29 DIAGNOSIS — Z96.651 STATUS POST UNICOMPARTMENTAL KNEE REPLACEMENT, RIGHT: Primary | ICD-10-CM

## 2021-01-29 DIAGNOSIS — R26.2 DIFFICULTY WALKING DUE TO KNEE JOINT: ICD-10-CM

## 2021-01-29 PROCEDURE — 97530 THERAPEUTIC ACTIVITIES: CPT | Performed by: PHYSICAL THERAPIST

## 2021-01-29 PROCEDURE — 97110 THERAPEUTIC EXERCISES: CPT | Performed by: PHYSICAL THERAPIST

## 2021-01-29 PROCEDURE — 97014 ELECTRIC STIMULATION THERAPY: CPT | Performed by: PHYSICAL THERAPIST

## 2021-01-29 PROCEDURE — 97140 MANUAL THERAPY 1/> REGIONS: CPT | Performed by: PHYSICAL THERAPIST

## 2021-01-29 NOTE — PROGRESS NOTES
Physical Therapy Daily Progress Note    Visit # : 9  Robert Harvey reports: Pt reports that he has been taking his pain medication prior to his PT visits and it has been making them more tolerable. States he is having an easier time walking around and limping less. States he has been having trouble sleeping due to pain on the back/side of his knee.    Subjective     Objective          Palpation   Left   Hypertonic in the distal biceps femoris.   Tenderness of the distal biceps femoris.     Tenderness     Right Knee   Tenderness in the fibular head (Moderate).     Active Range of Motion     Right Knee   Flexion: 118 degrees   Extension: 0 degrees     Passive Range of Motion     Right Knee   Flexion: 125 degrees       See Exercise, Manual, and Modality Logs for complete treatment.       Assessment & Plan     Assessment  Assessment details: Pt tolerated treatment well. Pt TTP distal bicep femoris muscle belly/tendon on right and fibular head on that side. Pt continues to make good progress with his knee AROM in flexion/extension and strengthening.  Pt tolerated the increases in reps with leg press, increased TB, and weights with LAQs.  Pt continues to have quad fatigue during LAQs.  Will continue to see pt 2-3x/week for stretching, strengthening, and modalities PRN for pain.          Progress per Plan of Care           Manual Therapy:    10     mins  85146;  Therapeutic Exercise:   20      mins  91147;     Neuromuscular Velia:        mins  34650;    Therapeutic Activity:    15      mins  27945;     Gait Training:           mins  34200;     Ultrasound:          mins  02838;    Electrical Stimulation:  15       mins  28369 ( );  Dry Needling          mins self-pay    Timed Treatment:  45    mins   Total Treatment:     66   mins    Pavel Casper Student PT    Melani Tamez, PT  Physical Therapist

## 2021-02-01 ENCOUNTER — TREATMENT (OUTPATIENT)
Dept: PHYSICAL THERAPY | Facility: CLINIC | Age: 51
End: 2021-02-01

## 2021-02-01 DIAGNOSIS — R26.2 DIFFICULTY WALKING UP STEP: ICD-10-CM

## 2021-02-01 DIAGNOSIS — G89.18 ACUTE POSTOPERATIVE PAIN OF RIGHT KNEE: ICD-10-CM

## 2021-02-01 DIAGNOSIS — R26.2 DIFFICULTY WALKING DUE TO KNEE JOINT: ICD-10-CM

## 2021-02-01 DIAGNOSIS — R26.2 DIFFICULTY WALKING DOWN STEP: ICD-10-CM

## 2021-02-01 DIAGNOSIS — M25.561 ACUTE POSTOPERATIVE PAIN OF RIGHT KNEE: ICD-10-CM

## 2021-02-01 DIAGNOSIS — Z96.651 STATUS POST UNICOMPARTMENTAL KNEE REPLACEMENT, RIGHT: Primary | ICD-10-CM

## 2021-02-01 DIAGNOSIS — Z96.651 S/P RIGHT UNICOMPARTMENTAL KNEE REPLACEMENT: ICD-10-CM

## 2021-02-01 PROCEDURE — 97530 THERAPEUTIC ACTIVITIES: CPT | Performed by: PHYSICAL THERAPIST

## 2021-02-01 PROCEDURE — 97014 ELECTRIC STIMULATION THERAPY: CPT | Performed by: PHYSICAL THERAPIST

## 2021-02-01 PROCEDURE — 97110 THERAPEUTIC EXERCISES: CPT | Performed by: PHYSICAL THERAPIST

## 2021-02-01 PROCEDURE — 97140 MANUAL THERAPY 1/> REGIONS: CPT | Performed by: PHYSICAL THERAPIST

## 2021-02-01 NOTE — PROGRESS NOTES
Physical Therapy Daily Progress Note        Patient: Robert Harvey   : 1970  Diagnosis/ICD-10 Code:  Status post unicompartmental knee replacement, right [Z96.651]  Referring practitioner: Ricky Gracia MD  Date of Initial Visit: Type: THERAPY  Noted: 2021  Today's Date: 2021  Patient seen for 10 sessions         Robert Harvey reports: he started taking his meloxicam again and that has helped with his pain especially behind the knee.  He said it is swollen today but he's been icing it. He rated his pain as 1/10.  He said it was pretty swollen yesterday after shopping a lot.         Subjective     Objective          Active Range of Motion     Right Knee   Flexion: Right knee active flexion: 121 (supine), 117 (seated)   Extension: 0 degrees     Passive Range of Motion     Right Knee   Flexion: 125 (supine) degrees       See Exercise, Manual, and Modality Logs for complete treatment.       Assessment/Plan  Pt demonstrated consistent ROM this date however able to progress functional strengthening this date.  Added ball with LAQ to increased activation of VMO.  Continued fatigue and muscle trembling noted with this exercises.  He was also able to progress reps with hip strengthening and increase height with step downs.  Increased cueing required for proper foot placement with sit to stands to improve proper weight shift and increased strengthening of (R) LE.  Continued follow up will be necessary to continue to develop strength and improve ability to get off low surfaces and up and down from the ground necessary for return to work.  Will continue to progress strengthening as tolerated.     Progress per Plan of Care           Manual Therapy:    8     mins  37761;  Therapeutic Exercise:    27     mins  20476;     Neuromuscular Velia:        mins  77488;    Therapeutic Activity:   10       mins  82387;     Gait Training:           mins  59785;     Ultrasound:          mins  68420;    Electrical  Stimulation:    15     mins  08063 ( );  Dry Needling          mins self-pay    Timed Treatment:   45   mins   Total Treatment:     65   mins    Lizette Aguilar PTA  Physical Therapist Assistant

## 2021-02-04 ENCOUNTER — TREATMENT (OUTPATIENT)
Dept: PHYSICAL THERAPY | Facility: CLINIC | Age: 51
End: 2021-02-04

## 2021-02-04 DIAGNOSIS — R26.2 DIFFICULTY WALKING DOWN STEP: ICD-10-CM

## 2021-02-04 DIAGNOSIS — M25.561 ACUTE POSTOPERATIVE PAIN OF RIGHT KNEE: ICD-10-CM

## 2021-02-04 DIAGNOSIS — R26.2 DIFFICULTY WALKING DUE TO KNEE JOINT: ICD-10-CM

## 2021-02-04 DIAGNOSIS — G89.18 ACUTE POSTOPERATIVE PAIN OF RIGHT KNEE: ICD-10-CM

## 2021-02-04 DIAGNOSIS — R26.2 DIFFICULTY WALKING UP STEP: ICD-10-CM

## 2021-02-04 DIAGNOSIS — Z96.651 STATUS POST UNICOMPARTMENTAL KNEE REPLACEMENT, RIGHT: Primary | ICD-10-CM

## 2021-02-04 DIAGNOSIS — Z96.651 S/P RIGHT UNICOMPARTMENTAL KNEE REPLACEMENT: ICD-10-CM

## 2021-02-04 DIAGNOSIS — M79.604 RIGHT LEG PAIN: ICD-10-CM

## 2021-02-04 PROCEDURE — 97014 ELECTRIC STIMULATION THERAPY: CPT | Performed by: PHYSICAL THERAPIST

## 2021-02-04 PROCEDURE — 97140 MANUAL THERAPY 1/> REGIONS: CPT | Performed by: PHYSICAL THERAPIST

## 2021-02-04 PROCEDURE — 97112 NEUROMUSCULAR REEDUCATION: CPT | Performed by: PHYSICAL THERAPIST

## 2021-02-04 PROCEDURE — 97530 THERAPEUTIC ACTIVITIES: CPT | Performed by: PHYSICAL THERAPIST

## 2021-02-04 PROCEDURE — 97110 THERAPEUTIC EXERCISES: CPT | Performed by: PHYSICAL THERAPIST

## 2021-02-04 NOTE — PROGRESS NOTES
Physical Therapy Daily Progress Note  Patient: Robert Harvey   : 1970  Diagnosis/ICD-10 Code:  Status post unicompartmental knee replacement, right [Z96.651]  Referring practitioner: Ricky Gracia MD  Date of Initial Visit: Type: THERAPY  Noted: 2021  Today's Date: 2021  Patient seen for 11 sessions         Robert Harvey reports: Pt reports that he is having an easier time getting around and is limping less without his cane, but that he is still having trouble with stairs.        Subjective     Objective          Active Range of Motion     Right Knee   Flexion: 125 degrees   Extension: 0 degrees     Passive Range of Motion     Right Knee   Flexion: 134 degrees       See Exercise, Manual, and Modality Logs for complete treatment.       Assessment & Plan     Assessment  Assessment details: Pt tolerated treatment well on this date. Pt showing increased knee AROM/PROM in flexion. Pt educated in heel slide + OP from unaffected leg and was able to reach 134 deg of knee flexion after self mobilization. Pt continues to have complaints of soreness post exercise, but tolerates all exercise without increase in pain during treatment sessions. Pt progressed with step up and over stepping exercise to increase tolerance/strength with stepping/increasing balance/stability with the activity. Plan is to continue to see Pt 2x per week and continue to increase knee flexion AROM/maintain full knee AROM in extension, increase strength/endurance with knee/hip extension exercises of squatting/stepping, and increase ability to perform normal ADL's/recreational activities with decreased pain/improved function.        Progress per Plan of Care           Manual Therapy:     8    mins  67203;  Therapeutic Exercise:    18     mins  05065;     Neuromuscular Velia:    10    mins  06285;    Therapeutic Activity:      24    mins  36306;     Gait Training:           mins  02498;     Ultrasound:          mins  38581;     Electrical Stimulation:   15      mins  77393 ( );  Dry Needling          mins self-pay    Timed Treatment:   60   mins   Total Treatment:     79   mins    Pavel Casper Student PT    Melani Tamez, PT  Physical Therapist

## 2021-02-09 ENCOUNTER — TREATMENT (OUTPATIENT)
Dept: PHYSICAL THERAPY | Facility: CLINIC | Age: 51
End: 2021-02-09

## 2021-02-09 DIAGNOSIS — M25.561 ACUTE POSTOPERATIVE PAIN OF RIGHT KNEE: ICD-10-CM

## 2021-02-09 DIAGNOSIS — G89.18 ACUTE POSTOPERATIVE PAIN OF RIGHT KNEE: ICD-10-CM

## 2021-02-09 DIAGNOSIS — R26.2 DIFFICULTY WALKING DOWN STEP: ICD-10-CM

## 2021-02-09 DIAGNOSIS — Z96.651 STATUS POST UNICOMPARTMENTAL KNEE REPLACEMENT, RIGHT: Primary | ICD-10-CM

## 2021-02-09 DIAGNOSIS — R26.2 DIFFICULTY WALKING DUE TO KNEE JOINT: ICD-10-CM

## 2021-02-09 DIAGNOSIS — R26.2 DIFFICULTY WALKING UP STEP: ICD-10-CM

## 2021-02-09 PROCEDURE — 97110 THERAPEUTIC EXERCISES: CPT | Performed by: PHYSICAL THERAPIST

## 2021-02-09 PROCEDURE — 97014 ELECTRIC STIMULATION THERAPY: CPT | Performed by: PHYSICAL THERAPIST

## 2021-02-09 PROCEDURE — 97530 THERAPEUTIC ACTIVITIES: CPT | Performed by: PHYSICAL THERAPIST

## 2021-02-09 PROCEDURE — 97140 MANUAL THERAPY 1/> REGIONS: CPT | Performed by: PHYSICAL THERAPIST

## 2021-02-09 NOTE — PROGRESS NOTES
"   Physical Therapy Daily Progress Note    Patient: Robert Harvey   : 1970  Diagnosis/ICD-10 Code:  Status post unicompartmental knee replacement, right [Z96.651]  Referring practitioner: Ricky Gracia MD  Date of Initial Visit: Type: THERAPY  Noted: 2021  Today's Date: 2021  Patient seen for 12 sessions         Robert Harvey reports: Pt reports that his knee feels \"stiff\" but that he is still having good mobility.      Subjective     Objective          Active Range of Motion     Right Knee   Flexion: 128 degrees     Passive Range of Motion     Right Knee   Flexion: 133 degrees       See Exercise, Manual, and Modality Logs for complete treatment.       Assessment & Plan     Assessment  Assessment details: Pt tolerated treatment very well today. Pt showed increase in knee AROM and continued to show increase knee PROM post bike and self stretching of heel slides with OP. Pt progressed with increased step height with step up and over, increased load on leg press, progression of step up from forward step up without leg drive to step up with leg drive/SLS, and STS on lower surface on this date. Pt able to tolerate all progressions without increased reports of pain. Plan is to continue to see Pt 2x per week going forward and continue to increase his tolerance for stepping/squatting exercises, reduced continued antalgic gait pattern without AD, and increase ability to perform normal ADL's with reduced reports of pain/stiffness.        Progress per Plan of Care           Manual Therapy:   8      mins  61071;  Therapeutic Exercise:    10     mins  54339;     Neuromuscular Velia:        mins  15328;    Therapeutic Activity:    23      mins  45596;     Gait Training:           mins  86169;     Ultrasound:          mins  01025;    Electrical Stimulation:     15    mins  99891 ( );  Dry Needling          mins self-pay    Timed Treatment:  41   mins   Total Treatment:     56   mins    Pavel Gaming " PT    Melani Tamez, PT  Physical Therapist

## 2021-02-18 ENCOUNTER — TREATMENT (OUTPATIENT)
Dept: PHYSICAL THERAPY | Facility: CLINIC | Age: 51
End: 2021-02-18

## 2021-02-18 DIAGNOSIS — R26.2 DIFFICULTY WALKING DOWN STEP: ICD-10-CM

## 2021-02-18 DIAGNOSIS — M25.561 ACUTE POSTOPERATIVE PAIN OF RIGHT KNEE: ICD-10-CM

## 2021-02-18 DIAGNOSIS — R26.2 DIFFICULTY WALKING UP STEP: ICD-10-CM

## 2021-02-18 DIAGNOSIS — Z96.651 STATUS POST UNICOMPARTMENTAL KNEE REPLACEMENT, RIGHT: Primary | ICD-10-CM

## 2021-02-18 DIAGNOSIS — Z96.651 S/P RIGHT UNICOMPARTMENTAL KNEE REPLACEMENT: ICD-10-CM

## 2021-02-18 DIAGNOSIS — R26.2 DIFFICULTY WALKING DUE TO KNEE JOINT: ICD-10-CM

## 2021-02-18 DIAGNOSIS — G89.18 ACUTE POSTOPERATIVE PAIN OF RIGHT KNEE: ICD-10-CM

## 2021-02-18 PROCEDURE — 97140 MANUAL THERAPY 1/> REGIONS: CPT | Performed by: PHYSICAL THERAPIST

## 2021-02-18 PROCEDURE — 97530 THERAPEUTIC ACTIVITIES: CPT | Performed by: PHYSICAL THERAPIST

## 2021-02-18 PROCEDURE — 97014 ELECTRIC STIMULATION THERAPY: CPT | Performed by: PHYSICAL THERAPIST

## 2021-02-18 PROCEDURE — 97110 THERAPEUTIC EXERCISES: CPT | Performed by: PHYSICAL THERAPIST

## 2021-02-18 NOTE — PROGRESS NOTES
Physical Therapy Daily Progress Note    Patient: Robert Harvey   : 1970  Diagnosis/ICD-10 Code:  Status post unicompartmental knee replacement, right [Z96.651]  Referring practitioner: Ricky Gracia MD  Date of Initial Visit: Type: THERAPY  Noted: 2021  Today's Date: 2021  Patient seen for 13 sessions         Robert Harvey reports: Pt states that his knee isn't in pain, but that it feels very stiff today. States that he has been shoveling snow a lot and also took a 9 hour car trip recently. States his left knee is bothering him more lately.      Subjective     Objective          Active Range of Motion     Right Knee   Flexion: 125 degrees   Extension: Right knee active extension: +1 deg HE.     Additional Active Range of Motion Details  Pre manual:    Right knee:     Flexion: 119 deg    Passive Range of Motion     Right Knee   Flexion: 132 degrees     Additional Passive Range of Motion Details  Pre manual:    Right knee:     Flexion: 126 deg      See Exercise, Manual, and Modality Logs for complete treatment.       Assessment & Plan     Assessment  Assessment details: Pt tolerated treatment fairly well today. Pt had increases in knee flexion PROM/AROM post manual therapy techniques. Pt presents with increased subjective complaints of stiffness in his right knee and increased pain in his left non-surgical knee. Pt's exercise program not progressed secondary to that and a 9 day break from PT due to weather related cancellations. Plan is to continue to see Pt 2x per week for manual therapy, exercise, and modalities PRN.        Progress per Plan of Care           Manual Therapy:    8     mins  00242;  Therapeutic Exercise:    8     mins  81833;     Neuromuscular Velia:        mins  41716;    Therapeutic Activity:     24     mins  09164;     Gait Training:           mins  46398;     Ultrasound:          mins  20963;    Electrical Stimulation:    15     mins  53728 ( );  Dry Needling           mins self-pay    Timed Treatment:   40   mins   Total Treatment:     55   mins    Pavel Casper Student PT    Melani Tamez, PT  Physical Therapist

## 2021-02-22 ENCOUNTER — TREATMENT (OUTPATIENT)
Dept: PHYSICAL THERAPY | Facility: CLINIC | Age: 51
End: 2021-02-22

## 2021-02-22 DIAGNOSIS — M25.561 ACUTE POSTOPERATIVE PAIN OF RIGHT KNEE: ICD-10-CM

## 2021-02-22 DIAGNOSIS — Z96.651 S/P RIGHT UNICOMPARTMENTAL KNEE REPLACEMENT: ICD-10-CM

## 2021-02-22 DIAGNOSIS — Z96.651 STATUS POST UNICOMPARTMENTAL KNEE REPLACEMENT, RIGHT: Primary | ICD-10-CM

## 2021-02-22 DIAGNOSIS — M79.604 RIGHT LEG PAIN: ICD-10-CM

## 2021-02-22 DIAGNOSIS — R26.2 DIFFICULTY WALKING DOWN STEP: ICD-10-CM

## 2021-02-22 DIAGNOSIS — R26.2 DIFFICULTY WALKING DUE TO KNEE JOINT: ICD-10-CM

## 2021-02-22 DIAGNOSIS — G89.18 ACUTE POSTOPERATIVE PAIN OF RIGHT KNEE: ICD-10-CM

## 2021-02-22 DIAGNOSIS — R26.2 DIFFICULTY WALKING UP STEP: ICD-10-CM

## 2021-02-22 PROCEDURE — 97014 ELECTRIC STIMULATION THERAPY: CPT | Performed by: PHYSICAL THERAPIST

## 2021-02-22 PROCEDURE — 97530 THERAPEUTIC ACTIVITIES: CPT | Performed by: PHYSICAL THERAPIST

## 2021-02-22 PROCEDURE — 97110 THERAPEUTIC EXERCISES: CPT | Performed by: PHYSICAL THERAPIST

## 2021-02-22 NOTE — PROGRESS NOTES
Physical Therapy Daily Progress Note        Patient: Robert Harvey   : 1970  Diagnosis/ICD-10 Code:  Status post unicompartmental knee replacement, right [Z96.651]  Referring practitioner: Ricky Gracia MD  Date of Initial Visit: Type: THERAPY  Noted: 2021  Today's Date: 2021  Patient seen for 14 sessions         Robert Harvey reports: his knee is stiff but thinks it is just something that will take time.         Subjective     Objective   See Exercise, Manual, and Modality Logs for complete treatment.       Assessment/Plan  Normal knee flexion ROM per visual assessment this date.  Good tolerance to progression of strengthening exercises however continues to demonstrate decreased strength through extensor lag and decreased ROM/muscle trembling with SAQ/LAQ.  Pt reported continued pain to lateral hamstring tendon and discussed continued stretching, time, and dry needling as treatment options. Will continue to monitor tolerance to exercises and progress as able towards goals and previous level of function.     Progress per Plan of Care           Manual Therapy:    3     mins  29984;  Therapeutic Exercise:    15     mins  96326;     Neuromuscular Velia:        mins  03282;    Therapeutic Activity:    15      mins  24585;     Gait Training:           mins  61047;     Ultrasound:          mins  47685;    Electrical Stimulation:    15     mins  30066 ( );  Dry Needling          mins self-pay    Timed Treatment:   33   mins   Total Treatment:     49   mins    Lizette Aguilar PTA  Physical Therapist Assistant

## 2021-02-26 ENCOUNTER — TREATMENT (OUTPATIENT)
Dept: PHYSICAL THERAPY | Facility: CLINIC | Age: 51
End: 2021-02-26

## 2021-02-26 DIAGNOSIS — G89.18 ACUTE POSTOPERATIVE PAIN OF RIGHT KNEE: ICD-10-CM

## 2021-02-26 DIAGNOSIS — Z96.651 STATUS POST UNICOMPARTMENTAL KNEE REPLACEMENT, RIGHT: Primary | ICD-10-CM

## 2021-02-26 DIAGNOSIS — R26.2 DIFFICULTY WALKING DUE TO KNEE JOINT: ICD-10-CM

## 2021-02-26 DIAGNOSIS — R26.2 DIFFICULTY WALKING UP STEP: ICD-10-CM

## 2021-02-26 DIAGNOSIS — M25.561 ACUTE POSTOPERATIVE PAIN OF RIGHT KNEE: ICD-10-CM

## 2021-02-26 DIAGNOSIS — R26.2 DIFFICULTY WALKING DOWN STEP: ICD-10-CM

## 2021-02-26 PROCEDURE — 97530 THERAPEUTIC ACTIVITIES: CPT | Performed by: PHYSICAL THERAPIST

## 2021-02-26 PROCEDURE — 97014 ELECTRIC STIMULATION THERAPY: CPT | Performed by: PHYSICAL THERAPIST

## 2021-02-26 PROCEDURE — 97110 THERAPEUTIC EXERCISES: CPT | Performed by: PHYSICAL THERAPIST

## 2021-02-26 NOTE — PROGRESS NOTES
Physical Therapy Daily Progress Note      Patient: Robert Harvey   : 1970  Referring practitioner: Ricky Gracia MD  Date of Initial Visit: Type: THERAPY  Noted: 2021  Today's Date: 2021  Patient seen for 15 sessions         Robert Harvey reports: 1-1.5/10 today with reports of mostly stiffness       Objective   See Exercise, Manual, and Modality Logs for complete treatment.     R knee AAROM  -2-0-132  Extension measured with heel prop  Flexion measured with use of strap    Assessment/Plan  Pt reports pain is controlled and no pain reported with exercises this date. Addition of retro step up, HS curls, HS stretch and ITBand stretch this date. Pt reports R lateral knee pain with tenderness to palpation to lateral joint line and along distal ITB attachment. Pt demonstrates good strength in RLE but quick fatigue with education on importance of muscle endurance such as use of stationary bike or walking for distance/time. Pt reports he starts work on Monday; pt will be driving and advised to get out and stretch knee/leg every hour or so to decrease stiffness and to ice after work as needed.       Progress per Plan of Care and Progress strengthening /stabilization /functional activity           Timed:  Manual Therapy:    5     mins  48379;  Therapeutic Exercise:    34     mins  91392;     Neuromuscular Velia:    -    mins  73399;    Therapeutic Activity:     9     mins  95066;     Gait Training:      -     mins  23533;     Ultrasound:     -     mins  76390;    Electrical Stimulation:    -     mins  93039 ( );    Untimed:  Electrical Stimulation:    15     mins  08227 ( );  Mechanical Traction:    -     mins  30828;     Timed Treatment:   48   mins   Total Treatment:     65   mins  Rochelle Howard PT  Physical Therapist

## 2021-03-01 ENCOUNTER — TREATMENT (OUTPATIENT)
Dept: PHYSICAL THERAPY | Facility: CLINIC | Age: 51
End: 2021-03-01

## 2021-03-01 DIAGNOSIS — G89.18 ACUTE POSTOPERATIVE PAIN OF RIGHT KNEE: ICD-10-CM

## 2021-03-01 DIAGNOSIS — R26.2 DIFFICULTY WALKING DOWN STEP: ICD-10-CM

## 2021-03-01 DIAGNOSIS — Z96.651 STATUS POST UNICOMPARTMENTAL KNEE REPLACEMENT, RIGHT: Primary | ICD-10-CM

## 2021-03-01 DIAGNOSIS — M25.561 ACUTE POSTOPERATIVE PAIN OF RIGHT KNEE: ICD-10-CM

## 2021-03-01 DIAGNOSIS — R26.2 DIFFICULTY WALKING DUE TO KNEE JOINT: ICD-10-CM

## 2021-03-01 DIAGNOSIS — Z96.651 S/P RIGHT UNICOMPARTMENTAL KNEE REPLACEMENT: ICD-10-CM

## 2021-03-01 DIAGNOSIS — M79.604 RIGHT LEG PAIN: ICD-10-CM

## 2021-03-01 DIAGNOSIS — R26.2 DIFFICULTY WALKING UP STEP: ICD-10-CM

## 2021-03-01 PROCEDURE — 97110 THERAPEUTIC EXERCISES: CPT | Performed by: PHYSICAL THERAPIST

## 2021-03-01 PROCEDURE — 97530 THERAPEUTIC ACTIVITIES: CPT | Performed by: PHYSICAL THERAPIST

## 2021-03-01 PROCEDURE — 97014 ELECTRIC STIMULATION THERAPY: CPT | Performed by: PHYSICAL THERAPIST

## 2021-03-01 NOTE — PROGRESS NOTES
Physical Therapy Daily Progress Note    Patient: Robert Harvey   : 1970  Diagnosis/ICD-10 Code:  Status post unicompartmental knee replacement, right [Z96.651]  Referring practitioner: Ricky Gracia MD  Date of Initial Visit: Type: THERAPY  Noted: 2021  Today's Date: 3/1/2021  Patient seen for 16 sessions         Robert Harvey reports: Pt states that he starts work today. States his Lt knee is bothering him more than his Rt and that his Rt knee mostly feels stiff but does have some pain in his lateral knee that he says is 4/5. Pt states that he has been dealing with that pain since before his surgery.      Subjective     Objective          Palpation   Left   Hypertonic in the distal biceps femoris.   Tenderness of the distal biceps femoris.     Active Range of Motion     Right Knee   Flexion: 128 degrees     Passive Range of Motion     Right Knee   Flexion: 132 (Achieved independently ) degrees     Strength/Myotome Testing     Right Knee   Flexion: 4+      See Exercise, Manual, and Modality Logs for complete treatment.       Assessment & Plan     Assessment  Assessment details: Pt tolerated treatment well on this date. Manual therapy held secondary to Pt being able to reach WNL knee AAROM independently. Pt progressed with standing quad stretch for further increases in knee flexion PROM/AROM. Pt progressed w/ increased load/volume with CKC strengthening exercises for hip/knee extensors for improved performance with stepping/squatting. Pt presents with TTP to distal biceps femoris and painful knee flexion MMT. Pt progressed with reverse lunge on furniture slider for hamstring/posterior chain strengthening. Pt able to tolerate exercise without increased reports of pain. Plan is to decrease frequency to 1x per week and monitor lateral hamstring tenderness/pain and continue to increase Rt knee AROM/PROM, tolerance for stairs/steps, and decreased reports of stiffness in Rt knee. Pt to attempt dry  needling to see if it can decrease lateral hamstring tone/pain.        Progress per Plan of Care           Manual Therapy:         mins  06822;  Therapeutic Exercise:    25     mins  03873;     Neuromuscular Velia:        mins  04526;    Therapeutic Activity:     25     mins  23834;     Gait Training:           mins  07553;     Ultrasound:          mins  29790;    Electrical Stimulation:    15     mins  10047 ( );  Dry Needling          mins self-pay    Timed Treatment:   50   mins   Total Treatment:    65  mins    Pavel Casper Student PT    Melani Tamez, PT  Physical Therapist

## 2021-03-05 ENCOUNTER — TREATMENT (OUTPATIENT)
Dept: PHYSICAL THERAPY | Facility: CLINIC | Age: 51
End: 2021-03-05

## 2021-03-05 DIAGNOSIS — Z96.651 S/P RIGHT UNICOMPARTMENTAL KNEE REPLACEMENT: ICD-10-CM

## 2021-03-05 DIAGNOSIS — M25.561 ACUTE POSTOPERATIVE PAIN OF RIGHT KNEE: ICD-10-CM

## 2021-03-05 DIAGNOSIS — R26.2 DIFFICULTY WALKING DUE TO KNEE JOINT: ICD-10-CM

## 2021-03-05 DIAGNOSIS — Z96.651 STATUS POST UNICOMPARTMENTAL KNEE REPLACEMENT, RIGHT: Primary | ICD-10-CM

## 2021-03-05 DIAGNOSIS — G89.18 ACUTE POSTOPERATIVE PAIN OF RIGHT KNEE: ICD-10-CM

## 2021-03-05 DIAGNOSIS — R26.2 DIFFICULTY WALKING DOWN STEP: ICD-10-CM

## 2021-03-05 DIAGNOSIS — R26.2 DIFFICULTY WALKING UP STEP: ICD-10-CM

## 2021-03-05 PROCEDURE — 97014 ELECTRIC STIMULATION THERAPY: CPT | Performed by: PHYSICAL THERAPIST

## 2021-03-05 PROCEDURE — 97110 THERAPEUTIC EXERCISES: CPT | Performed by: PHYSICAL THERAPIST

## 2021-03-05 PROCEDURE — 97530 THERAPEUTIC ACTIVITIES: CPT | Performed by: PHYSICAL THERAPIST

## 2021-03-05 NOTE — PROGRESS NOTES
Physical Therapy Daily Progress Note        Patient: Robert Harvey   : 1970  Diagnosis/ICD-10 Code:  Status post unicompartmental knee replacement, right [Z96.651]  Referring practitioner: Ricky Gracia MD  Date of Initial Visit: Type: THERAPY  Noted: 2021  Today's Date: 3/5/2021  Patient seen for 17 sessions         Robert Harvey reports: his knees have been swollen and hurting all week.  This was his first full week at work.  He said his pain today is a dull ache and rated it 2-3/10.  He has been trying to ride his bike every night.         Subjective     Objective   See Exercise, Manual, and Modality Logs for complete treatment.       Assessment/Plan  Reintroduced gentle stretching to assist with warm up due to increased pain/ache today following return to work.  Closely monitored for modifications however only required for SLR.  Decreased tremor noted with LAQ indicating improved strength.  Held progressions this date due to pt subjective.  Elevated LE during estim to assist with decreased swelling.  Will continue to monitor tolerance to exercises and symptoms with return to work to modify exercises as appropriate.      Progress per Plan of Care             Manual Therapy:         mins  92743;  Therapeutic Exercise:    28     mins  09332;     Neuromuscular Velia:        mins  16257;    Therapeutic Activity:     15     mins  74922;     Gait Training:           mins  39506;     Ultrasound:          mins  57713;    Electrical Stimulation:    15     mins  96499 ( );  Dry Needling          mins self-pay    Timed Treatment:   43   mins   Total Treatment:     58   mins    Lizette Aguilar PTA  Physical Therapist Assistant

## 2021-03-08 ENCOUNTER — TREATMENT (OUTPATIENT)
Dept: PHYSICAL THERAPY | Facility: CLINIC | Age: 51
End: 2021-03-08

## 2021-03-08 DIAGNOSIS — M79.604 RIGHT LEG PAIN: Primary | ICD-10-CM

## 2021-03-08 DIAGNOSIS — R26.2 DIFFICULTY WALKING UP STEP: ICD-10-CM

## 2021-03-08 DIAGNOSIS — Z96.651 S/P RIGHT UNICOMPARTMENTAL KNEE REPLACEMENT: ICD-10-CM

## 2021-03-08 DIAGNOSIS — Z96.651 STATUS POST UNICOMPARTMENTAL KNEE REPLACEMENT, RIGHT: Primary | ICD-10-CM

## 2021-03-08 DIAGNOSIS — R26.2 DIFFICULTY WALKING DOWN STEP: ICD-10-CM

## 2021-03-08 DIAGNOSIS — M25.561 ACUTE POSTOPERATIVE PAIN OF RIGHT KNEE: ICD-10-CM

## 2021-03-08 DIAGNOSIS — G89.18 ACUTE POSTOPERATIVE PAIN OF RIGHT KNEE: ICD-10-CM

## 2021-03-08 DIAGNOSIS — R26.2 DIFFICULTY WALKING DUE TO KNEE JOINT: ICD-10-CM

## 2021-03-08 PROCEDURE — 97110 THERAPEUTIC EXERCISES: CPT | Performed by: PHYSICAL THERAPIST

## 2021-03-08 PROCEDURE — 97014 ELECTRIC STIMULATION THERAPY: CPT | Performed by: PHYSICAL THERAPIST

## 2021-03-08 PROCEDURE — 97530 THERAPEUTIC ACTIVITIES: CPT | Performed by: PHYSICAL THERAPIST

## 2021-03-08 PROCEDURE — DRYNDL PR CUSTOM DRY NEEDLING SELF PAY: Performed by: PHYSICAL THERAPIST

## 2021-03-08 NOTE — PROGRESS NOTES
Physical Therapy Daily Progress Note/Re-Assessment    Patient: Robert Harvey   : 1970  Diagnosis/ICD-10 Code:  Status post unicompartmental knee replacement, right [Z96.651]  Referring practitioner: Ricky Gracia MD  Date of Initial Visit: Type: THERAPY  Noted: 2021  Today's Date: 3/8/2021  Patient seen for 18 sessions         Robert Harvey reports: Pt states that his knee feels good, but that his hamstring is really bothering him still and is a 3-4/10 with steps, getting into/out of his car/bed, and with first few steps. Walking is 1/10 pain after he gets going.    Subjective     LEFS: 67/80  Objective          Palpation     Right   Hypertonic in the distal biceps femoris. Tenderness of the distal biceps femoris.     Tenderness     Right Knee   Tenderness in the fibular head.     Active Range of Motion     Lumbar   Normal active range of motion    Right Knee   Flexion: Right knee active flexion: 125.   Extension: Right knee active extension: +1 deg HE.     Additional Active Range of Motion Details  Repeated motions:    Lumbar flexion 10x: WNL    Lumbar extension 10x: WNL    Strength/Myotome Testing     Right Hip   Planes of Motion   Extension: 5  Abduction: 5  Adduction: 5    Isolated Muscles   Gluteus maximums: 5    Tests       Thoracic   Positive slump.     Lumbar     Right   Negative passive SLR.     Swelling     Right Knee Girth Measurement (cm)   Joint line: 49 cm  10 cm above joint line: 60 cm  10 cm below joint line: 41 cm    Ambulation     Observational Gait   Right swing time and right step length within functional limits. Decreased right stance time.   Right foot contact pattern: heel to toe    Additional Observational Gait Details  Pt still ambulating with moderate antalgic gait on Rt without no assistive device.      See Exercise, Manual, and Modality Logs for complete treatment.       Assessment & Plan     Assessment  Assessment details: Pt progressing well in PT. Pt has met 3/6  STG's and 4/6 LTG's. Pt still presenting with TTP to distal bicep femoris/fibular head, moderate antalgic gait on Rt without assistive device, and increased pain with coming from sit to stand/steps. Pt demonstrates WNL hip/knee strength & knee AROM/PROM. Pt tolerated treatment well on this date. Manual therapy continued to be held secondary to Pt being able to achieve WNL knee PROM independently. Pt not fully progressed on this date secondary to continued complaints of lateral knee pain consistent with biceps femoris tendon/muscle belly irritation/trigger point. Pt's lumbar spine cleared on this date with no concordant sign present. Pt to be dry needled as soon as schedule permits. Plan is to continue to see Pt 2x per week for manual therapy, exercise, and modalities PRN.      Progress per Plan of Care     Goals  Plan Goals: STGs: 2-4 weeks  1. Decrease right  knee pain 4/10 with ambulation and steps.  MET  2. Increase right knee AROM 0-110 degrees  MET  3.  Decrease right medial and lateral knee joint tenderness to none with palpation.   PROGRESSING  4.  Increase right hamstring flexibility to WNL with SLR test  PROGRESSING  5.  Pt ambulates into clinic without an AD minimal antalgic gait right LE.  PROGRESSING  6.  Decrease right knee effusion at girth measurements by 3-4 cm MET     LTGs: 4-8 weeks  1.  Pt Independent with HEP. MET  2.  Increase right knee AROM 0-120 degrees MET  3.  Increase right knee flex strength to 5/5  MET  4.  Increase right hip flex, abd, add strength to 4+-5/5. MET  5.  Pt ambulates right LE without an antalgic gait.  PROGRESSING  6.  Decrease right knee pain to a 1-2/10 with ambulation and steps.   PROGRESSING       Manual Therapy:         mins  93504;  Therapeutic Exercise:    16     mins  05223;     Neuromuscular Velia:        mins  73510;    Therapeutic Activity:     24     mins  94986;     Gait Training:           mins  70425;     Ultrasound:          mins  37785;    Electrical  Stimulation:    15     mins  27945 ( );  Dry Needling          mins self-pay    Timed Treatment:   40   mins   Total Treatment:     77   mins    Pavel Casper Student PT    Melani Tamez, PT  Physical Therapist

## 2021-03-08 NOTE — PATIENT INSTRUCTIONS
Patient instructed to heat area again tonight and increase hydration. Continue ice on the knee joint and add for edema.

## 2021-03-08 NOTE — PROGRESS NOTES
Physical Therapy Initial Evaluation and Plan of Care         Patient: Robert Harevy   : 1970  Diagnosis/ICD-10 Code:  Right leg pain [M79.604]  Referring practitioner: Self  Date of Initial Visit: 3/8/2021  Today's Date: 3/8/2021  Patient seen for 1 sessions                Subjective Evaluation    History of Present Illness  Date of surgery: 2020  Mechanism of injury: Had partial knee replacement done but pain in hamstring hasn't changed.   No trauma, bothering him for several months. Has not improved with surgery.     .    Subjective comment: Spasms and tightness in the hamstring.  Worse first thing in the monrning.  Worse after sitting or driving. No paresethesias.  Pain does radiate to calf and side of knee to front. History:  pt denies active infection, blood bourne illness, needle phobia, use of blood thinners (only Meloxicam), any compromised immune system,  Patient Occupation: Drives for medical equipment, lifting and moving dialysis.   Precautions and Work Restrictions: NonePain  Location: lateral right hamstring, ITB, lateral knee.  Relieving factors: change in position, rest and support  Aggravating factors: ambulation (short steps, knee extension, driving)    Treatments  Previous treatment: physical therapy and injection treatment  Patient Goals  Patient goals for therapy: decreased pain, return to sport/leisure activities and return to work             Objective          Palpation     Right   Hypertonic in the distal biceps femoris, lateral gastrocnemius, medial gastrocnemius and TFL. Tenderness of the distal biceps femoris, lateral gastrocnemius, medial gastrocnemius and TFL.     Additional Palpation Details  No tenderness lumbar spine, musculature, gluts, sciatic notch, or proximal hamstring.  Distal ITB, proximal peroneals    Neurological Testing     Sensation     Knee     Right Knee   Intact: light touch     Comments   Right light touch: decreased lateral patella.     Tests      Right Ankle/Foot   Negative for Homans' sign.     Ambulation   Weight-Bearing Status   Weight-Bearing Status (Right): full weight-bearing     Additional Weight-Bearing Status Details  See PT note for objective measurements, same today before treatment.   Patient was instructed in indications for dry needling treatment,  conditions treated, procedure to be performed, possible side effects, contraindications, and risks of the procedure.  All questions were answered and patient given information sheet.  Patient agreed to have dry needling treatment performed and signed the consent.       Assessment/Plan  Patient presents with increased tenderness and guarding distal right biceps femoris, popliteal space, gastrocs, ITB, and peroneals without paresthesias or neural involvement. Patient tolerated dry needling treatment well with decreased tenderness, pain, and guarding as well as improved mobility after treatment.  Manual Therapy:    -     mins  29830;  Therapeutic Exercise:    -     mins  77156;     Neuromuscular Velia:    -    mins  15905;    Therapeutic Activity:    10    mins  88490;     Gait Training:      -     mins  60381;     Ultrasound:     -     mins  51444;    Electrical Stimulation:    -     mins  35143 ( );  Dry Needling     15     mins self-pay    Timed Treatment:   25   mins   Total Treatment:     35   mins    PT SIGNATURE: Edyta Toledo, PT OCS    DATE TREATMENT INITIATED: 3/8/2021    Initial Certification  Certification Period: 6/6/2021  I certify that the therapy services are furnished while this patient is under my care.  The services outlined above are required by this patient, and will be reviewed every 90 days.     PHYSICIAN:       DATE:     Please sign and return via fax to [phone].. Thank you, [facility].

## 2021-03-15 ENCOUNTER — TREATMENT (OUTPATIENT)
Dept: PHYSICAL THERAPY | Facility: CLINIC | Age: 51
End: 2021-03-15

## 2021-03-15 DIAGNOSIS — Z96.651 STATUS POST UNICOMPARTMENTAL KNEE REPLACEMENT, RIGHT: ICD-10-CM

## 2021-03-15 DIAGNOSIS — M79.604 RIGHT LEG PAIN: ICD-10-CM

## 2021-03-15 DIAGNOSIS — R26.2 DIFFICULTY WALKING DUE TO KNEE JOINT: Primary | ICD-10-CM

## 2021-03-15 DIAGNOSIS — R26.2 DIFFICULTY WALKING DOWN STEP: ICD-10-CM

## 2021-03-15 DIAGNOSIS — M25.561 ACUTE POSTOPERATIVE PAIN OF RIGHT KNEE: ICD-10-CM

## 2021-03-15 DIAGNOSIS — Z96.651 S/P RIGHT UNICOMPARTMENTAL KNEE REPLACEMENT: ICD-10-CM

## 2021-03-15 DIAGNOSIS — G89.18 ACUTE POSTOPERATIVE PAIN OF RIGHT KNEE: ICD-10-CM

## 2021-03-15 DIAGNOSIS — R26.2 DIFFICULTY WALKING UP STEP: ICD-10-CM

## 2021-03-15 PROCEDURE — DRYNDL PR CUSTOM DRY NEEDLING SELF PAY: Performed by: PHYSICAL THERAPIST

## 2021-03-15 PROCEDURE — 97530 THERAPEUTIC ACTIVITIES: CPT | Performed by: PHYSICAL THERAPIST

## 2021-03-15 PROCEDURE — 97110 THERAPEUTIC EXERCISES: CPT | Performed by: PHYSICAL THERAPIST

## 2021-03-15 PROCEDURE — 97010 HOT OR COLD PACKS THERAPY: CPT | Performed by: PHYSICAL THERAPIST

## 2021-03-15 NOTE — PROGRESS NOTES
Physical Therapy Daily Progress Note    Patient: Robert Harvey   : 1970  Diagnosis/ICD-10 Code:  No primary diagnosis found.  Referring practitioner: No ref. provider found  Date of Initial Visit: No linked episodes  Today's Date: 3/15/2021  Patient seen for Visit count could not be calculated. Make sure you are using a visit which is associated with an episode. sessions           Subjective Did really well after the needling last time, I marked where it still hurts. Hamstring is much better, more in calf now too.     Objective   See Exercise, Manual, and Modality Logs for complete treatment.       Assessment/Plan Pt tolerated dry needling well today, added peroneals and lateral calf pistoning with several LTRs and release of tension noted.            Timed:    Manual Therapy:         mins  60270;  Therapeutic Exercise:         mins  01050;     Neuromuscular Velia:        mins  02628;    Therapeutic Activity:          mins  28694;     Gait Training:           mins  68963;     Ultrasound:          mins  11880;    Electrical Stimulation:         mins  12829 ( );  Iontophoresis         mins 35228;  Aquatic Therapy         mins 81260;  Dry Needling              15     mins    Untimed:  Electrical Stimulation:         mins  75956 (MC );  Mechanical Traction:         mins  41484;     Timed Treatment:   15   mins   Total Treatment:     25   mins  Akilah Ramos, PT  Physical Therapist

## 2021-03-15 NOTE — PROGRESS NOTES
Physical Therapy Daily Progress Note    Patient: Robert Harvey   : 1970  Diagnosis/ICD-10 Code:  Difficulty walking due to knee joint [R26.2]  Referring practitioner: Ricky Gracia MD  Date of Initial Visit: Type: THERAPY  Noted: 2021  Today's Date: 3/15/2021  Patient seen for 19 sessions         Robert Harvey reports: Pt states that his hamstring pain has been reduced since last week. States he is still having some pain and swelling on the outside of his knee. Pt states he has been experiencing less pain at rest in the last week and states his pain with steps is 2/10.    Subjective     Objective          Palpation     Right   No palpable tenderness to the distal biceps femoris.   Hypertonic in the lateral gastrocnemius. Tenderness of the lateral gastrocnemius.     Right Knee Comments  Right lateral gastrocnemius: Min\.     Tenderness     Right Knee   Tenderness in the lateral joint line (Mod).     Active Range of Motion     Right Knee   Flexion: 127 degrees   Extension: 0 degrees     Passive Range of Motion     Right Knee   Flexion: 133 degrees       See Exercise, Manual, and Modality Logs for complete treatment.       Assessment & Plan     Assessment  Assessment details: Pt tolerated treatment very well today. Manual therapy continued to be held secondary to Pt being able to reach WNL of knee AROM/PROM independently without pain. Pt with subjective decreased reports of pain/TTP to distal biceps femoris insertion/muscle belly post dry needling last week. Pt presents w/ TTP to lateral joint line/lateral gastroc. Pt progressed with increased loading/volume for knee/hip extension CKC/OKC exercises to improve performance/decrease pain with squatting/stepping. Pt to be needled on this date for further decreases in TTP in lateral knee structures. 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        Progress per Plan of Care           Manual Therapy:         mins  26819;  Therapeutic Exercise:   17      mins  65797;     Neuromuscular Velia:        mins  71073;    Therapeutic Activity:     23     mins  11622;     Gait Training:           mins  89779;     Ultrasound:          mins  39536;    Electrical Stimulation:         mins  21297 ( );  Dry Needling          mins self-pay    Timed Treatment:   40   mins   Total Treatment:     40   mins    Pavel Casper Student PT    Melani Tamez, PT  Physical Therapist

## 2021-03-22 ENCOUNTER — TREATMENT (OUTPATIENT)
Dept: PHYSICAL THERAPY | Facility: CLINIC | Age: 51
End: 2021-03-22

## 2021-03-22 DIAGNOSIS — M79.604 RIGHT LEG PAIN: ICD-10-CM

## 2021-03-22 DIAGNOSIS — Z96.651 S/P RIGHT UNICOMPARTMENTAL KNEE REPLACEMENT: ICD-10-CM

## 2021-03-22 DIAGNOSIS — R26.2 DIFFICULTY WALKING DOWN STEP: ICD-10-CM

## 2021-03-22 DIAGNOSIS — R26.2 WALKING DIFFICULTY DUE TO LOWER LEG: ICD-10-CM

## 2021-03-22 DIAGNOSIS — G89.18 ACUTE POSTOPERATIVE PAIN OF RIGHT KNEE: ICD-10-CM

## 2021-03-22 DIAGNOSIS — M25.561 ACUTE POSTOPERATIVE PAIN OF RIGHT KNEE: ICD-10-CM

## 2021-03-22 DIAGNOSIS — R26.2 DIFFICULTY WALKING UP STEP: ICD-10-CM

## 2021-03-22 DIAGNOSIS — R26.2 DIFFICULTY WALKING DUE TO KNEE JOINT: Primary | ICD-10-CM

## 2021-03-22 DIAGNOSIS — Z96.651 STATUS POST UNICOMPARTMENTAL KNEE REPLACEMENT, RIGHT: ICD-10-CM

## 2021-03-22 PROCEDURE — DRYNDL PR CUSTOM DRY NEEDLING SELF PAY: Performed by: PHYSICAL THERAPIST

## 2021-03-22 PROCEDURE — 97530 THERAPEUTIC ACTIVITIES: CPT | Performed by: PHYSICAL THERAPIST

## 2021-03-22 PROCEDURE — 97110 THERAPEUTIC EXERCISES: CPT | Performed by: PHYSICAL THERAPIST

## 2021-03-22 NOTE — PROGRESS NOTES
Physical Therapy Daily Progress Note         Visit # : 2  Robert Harvey reports: I had relief both times, a little more soreness last time.  Tightness has returned mostly in side of thigh and calf.    Subjective     Objective          Palpation     Right   Hypertonic in the distal biceps femoris, distal semimembranosus, distal semitendinosus, lateral gastrocnemius, medial gastrocnemius and peroneus. Tenderness of the distal biceps femoris, lateral gastrocnemius and peroneus.     Additional Palpation Details  ITB      See Exercise, Manual, and Modality Logs for complete treatment.       Assessment/Plan  Improved pain and knee extension with gait after treatment. No tenderness at hamstring, ITB, or gastroc  Progress per Plan of Care - as neded           Manual Therapy:    -     mins  45854;  Therapeutic Exercise:    -     mins  53641;     Neuromuscular Velia:    -    mins  23457;    Therapeutic Activity:     -     mins  96304;     Gait Training:      -     mins  63277;     Ultrasound:     -     mins  56313;    Electrical Stimulation:    -     mins  20089 ( );  Dry Needling     15     mins self-pay    Timed Treatment:   15   mins   Total Treatment:     25   mins    Edyta Toledo, PT  Physical Therapist

## 2021-03-22 NOTE — PROGRESS NOTES
Physical Therapy Daily Progress Note        Patient: Robert Harvey   : 1970  Diagnosis/ICD-10 Code:  Difficulty walking due to knee joint [R26.2]  Referring practitioner: Ricky Gracia MD  Date of Initial Visit: Type: THERAPY  Noted: 2021  Today's Date: 3/22/2021  Patient seen for 20 sessions         Robert Harvey reports: his knee is great but everything around it still causes problems.  He said he still has trouble straightening it out after it has been bent from sleeping or sitting in the car.         Subjective     Objective          Active Range of Motion     Right Knee   Flexion: 128 degrees   Extension: 0 degrees       See Exercise, Manual, and Modality Logs for complete treatment.       Assessment/Plan  Pt tolerated exercises fairly well however reported discomfort following SLS on dynadisc.  Able to progress strengthening with several exercises including step up and overs to max typical height for community ambulation.  Slightly increased antalgic gait pattern at end of session.  Denied ice/stim due to dry needling following session.  Will monitor tolerance to exercises and progress as tolerated towards goals and previous level of function.     Progress per Plan of Care           Manual Therapy:         mins  43373;  Therapeutic Exercise:    20     mins  40286;     Neuromuscular Velia:        mins  48691;    Therapeutic Activity:    15      mins  24579;     Gait Training:           mins  64786;     Ultrasound:          mins  13390;    Electrical Stimulation:         mins  77072 ( );  Dry Needling          mins self-pay    Timed Treatment:   35   mins   Total Treatment:     35   mins    Lizette Aguilar PTA  Physical Therapist Assistant

## 2021-03-29 ENCOUNTER — TREATMENT (OUTPATIENT)
Dept: PHYSICAL THERAPY | Facility: CLINIC | Age: 51
End: 2021-03-29

## 2021-03-29 DIAGNOSIS — R26.2 DIFFICULTY WALKING DOWN STEP: ICD-10-CM

## 2021-03-29 DIAGNOSIS — R26.2 DIFFICULTY WALKING UP STEP: ICD-10-CM

## 2021-03-29 DIAGNOSIS — R26.2 DIFFICULTY WALKING DUE TO KNEE JOINT: Primary | ICD-10-CM

## 2021-03-29 DIAGNOSIS — R73.01 ELEVATED FASTING GLUCOSE: ICD-10-CM

## 2021-03-29 DIAGNOSIS — Z96.651 STATUS POST UNICOMPARTMENTAL KNEE REPLACEMENT, RIGHT: ICD-10-CM

## 2021-03-29 DIAGNOSIS — D75.1 SECONDARY ERYTHROCYTOSIS: ICD-10-CM

## 2021-03-29 DIAGNOSIS — M79.604 RIGHT LEG PAIN: ICD-10-CM

## 2021-03-29 DIAGNOSIS — E03.9 ACQUIRED HYPOTHYROIDISM: ICD-10-CM

## 2021-03-29 DIAGNOSIS — Z12.5 SCREENING FOR PROSTATE CANCER: ICD-10-CM

## 2021-03-29 PROCEDURE — 97530 THERAPEUTIC ACTIVITIES: CPT | Performed by: PHYSICAL THERAPIST

## 2021-03-29 PROCEDURE — DRYNDL PR CUSTOM DRY NEEDLING SELF PAY: Performed by: PHYSICAL THERAPIST

## 2021-03-29 PROCEDURE — 97110 THERAPEUTIC EXERCISES: CPT | Performed by: PHYSICAL THERAPIST

## 2021-03-29 NOTE — PROGRESS NOTES
Physical Therapy Daily Progress Note         Visit # : 3  Robert Harvey reports: I am graduating from PT today but would like to continue dry needling.  I felt really good last time but was more sore.  Reports improved ROM after treatment today.    Subjective     Objective          Palpation     Right   Hypertonic in the distal biceps femoris, lateral gastrocnemius, rectus femoris and vastus lateralis. Tenderness of the distal biceps femoris, lateral gastrocnemius, rectus femoris and vastus lateralis.     Additional Palpation Details  Mid to distal ITB      See Exercise, Manual, and Modality Logs for complete treatment.       Assessment/Plan  Patient tolerated dry needling treatment well with decreased tenderness, pain, and increased closed chain knee extension with heel strike.  Other  Patient instructed to heat area again tonight and increase hydration.         Manual Therapy:    -     mins  27402;  Therapeutic Exercise:    -     mins  24614;     Neuromuscular Velia:    -    mins  76508;    Therapeutic Activity:     -     mins  91141;     Gait Training:      -     mins  09238;     Ultrasound:     -     mins  03532;    Electrical Stimulation:    -     mins  35274 ( );  Dry Needling     15    mins self-pay    Timed Treatment:   15   mins   Total Treatment:     25   mins    Edyta Toledo PT  Physical Therapist

## 2021-03-29 NOTE — PROGRESS NOTES
Physical Therapy Daily Progress Note    Patient: Robert Harvey   : 1970  Diagnosis/ICD-10 Code:  Difficulty walking due to knee joint [R26.2]  Referring practitioner: Ricky Gracia MD  Date of Initial Visit: Type: THERAPY  Noted: 2021  Today's Date: 3/29/2021  Patient seen for 21 sessions         Robert Harvey reports: Pt states that his knee is still bothering him on the outside/top, but overall he has improved. He states that he feels the needling has helped relieve his knee pain, but it still comes back. He states he feels his progress has stalled somewhat and that he is ready to discharge today.    Subjective Questionnaire: LEFS: 67/80      Subjective     Objective          Palpation     Right   Hypertonic in the distal biceps femoris and lateral gastrocnemius. Tenderness of the distal biceps femoris and lateral gastrocnemius.     Tenderness   Left Knee   Tenderness in the lateral joint line and quadriceps tendon (Lateral insertion).     Tests     Left Hip   SLR: Negative.     Right Hip   SLR: Negative.     Ambulation     Ambulation: Level Surfaces     Additional Level Surfaces Ambulation Details  Pt ambulates throughout clinic w/ minimal antalgic gait on Rt side with WNL step length/aneudy.      See Exercise, Manual, and Modality Logs for complete treatment.       Assessment & Plan     Assessment  Assessment details: Pt has progressed well in PT. Pt has met 5/6 STG's and 5/6 LTG's. Pt continues to have reports of lateral knee pain and TTP lateral gastroc/bicep femoris. Pt has WNL knee AROM/strength in his Rt knee, is able to perform ambulation, STS, steps with zero to minimal pain, and has improved as measured by FOM since eval. Pt tolerated treatment well today. Pt taken through performance of HEP to ensure proper form. Pt educated on performance of HEP in gym setting. Pt with concordant sign during performance of calf raise on this date and decreased reports of lateral knee pain post calf  raise. Pt to be discharged from PT on this date secondary to completion of 10/12 total goals. Skilled PT services no longer indicated at this time.        Progress per Plan of Care    Goals  Plan Goals: STGs: 2-4 weeks  1. Decrease right  knee pain 4/10 with ambulation and steps.  MET  2. Increase right knee AROM 0-110 degrees  MET  3.  Decrease right medial and lateral knee joint tenderness to none with palpation. NOT MET  4.  Increase right hamstring flexibility to WNL with SLR test MET  5.  Pt ambulates into clinic without an AD minimal antalgic gait right LE. MET  6.  Decrease right knee effusion at girth measurements by 3-4 cm MET     LTGs: 4-8 weeks  1.  Pt Independent with HEP. MET  2.  Increase right knee AROM 0-120 degrees MET  3.  Increase right knee flex strength to 5/5  MET  4.  Increase right hip flex, abd, add strength to 4+-5/5. MET  5.  Pt ambulates right LE without an antalgic gait. NOT MET  6.  Decrease right knee pain to a 1-2/10 with ambulation and steps. MET - 2/10 pain        Manual Therapy:         mins  14842;  Therapeutic Exercise:    12     mins  89682;     Neuromuscular Velia:        mins  24850;    Therapeutic Activity:     38     mins  28577;     Gait Training:           mins  66381;     Ultrasound:          mins  55278;    Electrical Stimulation:         mins  72109 ( );  Dry Needling          mins self-pay    Timed Treatment:   50   mins   Total Treatment:     55   mins    Pavel Casper Student PT    Melani Tamez, PT  Physical Therapist

## 2021-05-18 ENCOUNTER — LAB (OUTPATIENT)
Dept: LAB | Facility: HOSPITAL | Age: 51
End: 2021-05-18

## 2021-05-18 LAB
ALBUMIN SERPL-MCNC: 4.5 G/DL (ref 3.5–5.2)
ALBUMIN/GLOB SERPL: 2 G/DL
ALP SERPL-CCNC: 67 U/L (ref 39–117)
ALT SERPL W P-5'-P-CCNC: 29 U/L (ref 1–41)
ANION GAP SERPL CALCULATED.3IONS-SCNC: 11 MMOL/L (ref 5–15)
AST SERPL-CCNC: 30 U/L (ref 1–40)
BACTERIA UR QL AUTO: ABNORMAL /HPF
BILIRUB SERPL-MCNC: 1.2 MG/DL (ref 0–1.2)
BILIRUB UR QL STRIP: NEGATIVE
BUN SERPL-MCNC: 15 MG/DL (ref 6–20)
BUN/CREAT SERPL: 12.6 (ref 7–25)
CALCIUM SPEC-SCNC: 9.2 MG/DL (ref 8.6–10.5)
CHLORIDE SERPL-SCNC: 106 MMOL/L (ref 98–107)
CHOLEST SERPL-MCNC: 146 MG/DL (ref 0–200)
CLARITY UR: CLEAR
CO2 SERPL-SCNC: 23 MMOL/L (ref 22–29)
COLOR UR: YELLOW
CREAT SERPL-MCNC: 1.19 MG/DL (ref 0.76–1.27)
DEPRECATED RDW RBC AUTO: 45.2 FL (ref 37–54)
ERYTHROCYTE [DISTWIDTH] IN BLOOD BY AUTOMATED COUNT: 13.5 % (ref 12.3–15.4)
GFR SERPL CREATININE-BSD FRML MDRD: 64 ML/MIN/1.73
GLOBULIN UR ELPH-MCNC: 2.2 GM/DL
GLUCOSE SERPL-MCNC: 113 MG/DL (ref 65–99)
GLUCOSE UR STRIP-MCNC: NEGATIVE MG/DL
HBA1C MFR BLD: 5.4 % (ref 4.8–5.6)
HCT VFR BLD AUTO: 57.6 % (ref 37.5–51)
HDLC SERPL-MCNC: 30 MG/DL (ref 40–60)
HGB BLD-MCNC: 19.4 G/DL (ref 13–17.7)
HGB UR QL STRIP.AUTO: ABNORMAL
HYALINE CASTS UR QL AUTO: ABNORMAL /LPF
KETONES UR QL STRIP: NEGATIVE
LDLC SERPL CALC-MCNC: 90 MG/DL (ref 0–100)
LDLC/HDLC SERPL: 2.89 {RATIO}
LEUKOCYTE ESTERASE UR QL STRIP.AUTO: NEGATIVE
MCH RBC QN AUTO: 30.9 PG (ref 26.6–33)
MCHC RBC AUTO-ENTMCNC: 33.7 G/DL (ref 31.5–35.7)
MCV RBC AUTO: 91.7 FL (ref 79–97)
NITRITE UR QL STRIP: NEGATIVE
PH UR STRIP.AUTO: 5.5 [PH] (ref 4.5–8)
PLATELET # BLD AUTO: 171 10*3/MM3 (ref 140–450)
PMV BLD AUTO: 10 FL (ref 6–12)
POTASSIUM SERPL-SCNC: 4.5 MMOL/L (ref 3.5–5.2)
PROT SERPL-MCNC: 6.7 G/DL (ref 6–8.5)
PROT UR QL STRIP: ABNORMAL
RBC # BLD AUTO: 6.28 10*6/MM3 (ref 4.14–5.8)
RBC # UR: ABNORMAL /HPF
REF LAB TEST METHOD: ABNORMAL
SODIUM SERPL-SCNC: 140 MMOL/L (ref 136–145)
SP GR UR STRIP: 1.03 (ref 1–1.03)
SQUAMOUS #/AREA URNS HPF: ABNORMAL /HPF
T4 FREE SERPL-MCNC: 0.94 NG/DL (ref 0.93–1.7)
TRIGL SERPL-MCNC: 146 MG/DL (ref 0–150)
TSH SERPL DL<=0.05 MIU/L-ACNC: 4.82 UIU/ML (ref 0.27–4.2)
UROBILINOGEN UR QL STRIP: ABNORMAL
VLDLC SERPL-MCNC: 26 MG/DL (ref 5–40)
WBC # BLD AUTO: 7.66 10*3/MM3 (ref 3.4–10.8)
WBC UR QL AUTO: ABNORMAL /HPF

## 2021-05-18 PROCEDURE — 83036 HEMOGLOBIN GLYCOSYLATED A1C: CPT | Performed by: FAMILY MEDICINE

## 2021-05-18 PROCEDURE — 80061 LIPID PANEL: CPT | Performed by: FAMILY MEDICINE

## 2021-05-18 PROCEDURE — 85027 COMPLETE CBC AUTOMATED: CPT | Performed by: FAMILY MEDICINE

## 2021-05-18 PROCEDURE — 84443 ASSAY THYROID STIM HORMONE: CPT | Performed by: FAMILY MEDICINE

## 2021-05-18 PROCEDURE — G0103 PSA SCREENING: HCPCS | Performed by: FAMILY MEDICINE

## 2021-05-18 PROCEDURE — 84480 ASSAY TRIIODOTHYRONINE (T3): CPT | Performed by: FAMILY MEDICINE

## 2021-05-18 PROCEDURE — 81001 URINALYSIS AUTO W/SCOPE: CPT | Performed by: FAMILY MEDICINE

## 2021-05-18 PROCEDURE — 84439 ASSAY OF FREE THYROXINE: CPT | Performed by: FAMILY MEDICINE

## 2021-05-18 PROCEDURE — 36415 COLL VENOUS BLD VENIPUNCTURE: CPT

## 2021-05-18 PROCEDURE — 80053 COMPREHEN METABOLIC PANEL: CPT | Performed by: FAMILY MEDICINE

## 2021-05-19 LAB
PSA SERPL-MCNC: 0.4 NG/ML (ref 0–4)
T3 SERPL-MCNC: 104 NG/DL (ref 71–180)

## 2021-06-09 ENCOUNTER — OFFICE VISIT (OUTPATIENT)
Dept: FAMILY MEDICINE CLINIC | Facility: CLINIC | Age: 51
End: 2021-06-09

## 2021-06-09 VITALS
HEART RATE: 78 BPM | WEIGHT: 315 LBS | OXYGEN SATURATION: 96 % | DIASTOLIC BLOOD PRESSURE: 84 MMHG | SYSTOLIC BLOOD PRESSURE: 164 MMHG | BODY MASS INDEX: 37.19 KG/M2 | HEIGHT: 77 IN | TEMPERATURE: 97.8 F

## 2021-06-09 DIAGNOSIS — E03.9 ACQUIRED HYPOTHYROIDISM: Primary | ICD-10-CM

## 2021-06-09 DIAGNOSIS — E55.9 VITAMIN D DEFICIENCY: ICD-10-CM

## 2021-06-09 DIAGNOSIS — J30.1 CHRONIC SEASONAL ALLERGIC RHINITIS DUE TO POLLEN: ICD-10-CM

## 2021-06-09 DIAGNOSIS — I15.8 OTHER SECONDARY HYPERTENSION: ICD-10-CM

## 2021-06-09 DIAGNOSIS — E34.9 TESTOSTERONE DEFICIENCY: ICD-10-CM

## 2021-06-09 DIAGNOSIS — Z51.81 MEDICATION MONITORING ENCOUNTER: ICD-10-CM

## 2021-06-09 DIAGNOSIS — R73.01 ELEVATED FASTING GLUCOSE: ICD-10-CM

## 2021-06-09 PROBLEM — L74.0 HEAT RASH: Status: RESOLVED | Noted: 2020-10-12 | Resolved: 2021-06-09

## 2021-06-09 PROCEDURE — 99214 OFFICE O/P EST MOD 30 MIN: CPT | Performed by: FAMILY MEDICINE

## 2021-06-09 RX ORDER — FLUTICASONE PROPIONATE 50 MCG
1 SPRAY, SUSPENSION (ML) NASAL 2 TIMES DAILY
Qty: 48 ML | Refills: 1 | Status: SHIPPED | OUTPATIENT
Start: 2021-06-09

## 2021-06-09 RX ORDER — LEVOTHYROXINE AND LIOTHYRONINE 57; 13.5 UG/1; UG/1
90 TABLET ORAL DAILY
Qty: 30 TABLET | Refills: 5 | Status: SHIPPED | OUTPATIENT
Start: 2021-06-09 | End: 2021-12-06 | Stop reason: SDUPTHER

## 2021-06-09 RX ORDER — VALSARTAN 160 MG/1
160 TABLET ORAL DAILY
Qty: 90 TABLET | Refills: 3 | Status: SHIPPED | OUTPATIENT
Start: 2021-06-09 | End: 2021-12-06 | Stop reason: SDUPTHER

## 2021-06-09 RX ORDER — TESTOSTERONE CYPIONATE 200 MG/ML
200 INJECTION, SOLUTION INTRAMUSCULAR
Qty: 4 ML | Refills: 5 | Status: SHIPPED | OUTPATIENT
Start: 2021-06-09 | End: 2021-12-13

## 2021-06-09 NOTE — PROGRESS NOTES
"Chief Complaint   Patient presents with   • Hypothyroidism       Hypothyroidism: Patient presents for evaluation of thyroid function. Symptoms consist of fatigue, weight gain. Symptoms have present for 10 years. The symptoms are mild.  The problem has been gradually worsening.  Previous thyroid studies include TSH and total T4. The hypothyroidism is due to hypothyroidism.    Had a left partial knee replacement    Weight has gone way up    He is moving to [facility].    BP is up, he is willing to start meds      Vitals:    06/09/21 0943   BP: 164/84   Pulse: 78   Temp: 97.8 °F (36.6 °C)   TempSrc: Temporal   SpO2: 96%   Weight: (!) 167 kg (368 lb)   Height: 195.6 cm (77.01\")     Gen: well appearing, alert  Eyes: EOMI, no eye bulge  Neck: no LAD. Thyroid normal size, no nodules, no tenderness  Lung: good air movement, regular RR  Heart: RR without murmur  Skin: no rash.    Lab Results   Component Value Date    TSH 4.820 (H) 05/18/2021           Assessment/Plan   Diagnoses and all orders for this visit:    1. Acquired hypothyroidism (Primary)  -     Thyroid (NP THYROID) 90 MG PO tablet; Take 1 tablet by mouth Daily. Indications: Underactive Thyroid  Dispense: 30 tablet; Refill: 5  -     TSH; Future  -     T4, Free; Future  -     T3; Future    2. Chronic seasonal allergic rhinitis due to pollen  -     fluticasone (FLONASE) 50 MCG/ACT nasal spray; 1 spray into the nostril(s) as directed by provider 2 (two) times a day.  Dispense: 48 mL; Refill: 1    3. Testosterone deficiency  -     Testosterone, Free, Total; Future  -     Testosterone Cypionate (DEPOTESTOTERONE CYPIONATE) 200 MG/ML injection; Inject 1 mL into the appropriate muscle as directed by prescriber Every 7 (Seven) Days.  Dispense: 4 mL; Refill: 5    4. Vitamin D deficiency  -     Vitamin D 25 Hydroxy; Future    5. Other secondary hypertension  -     valsartan (Diovan) 160 MG tablet; Take 1 tablet by mouth Daily. Indications: High Blood Pressure Disorder  " Dispense: 90 tablet; Refill: 3  -     Comprehensive Metabolic Panel; Future  -     CBC (No Diff); Future  -     Lipid Panel; Future    6. Elevated fasting glucose  -     Hemoglobin A1c; Future    7. Medication monitoring encounter  -     PSA DIAGNOSTIC; Future    will start Diovan today    Will increase thyroid to 90 mg .             There are no Patient Instructions on file for this visit.    Continue to take thyroid medication on a regular basis, same time of day, on an empty stomach. Repeat thyroid labs in six months.    Dr. Contreras Gonzales MD  [place], [place]  [facility]

## 2021-07-13 DIAGNOSIS — E34.9 TESTOSTERONE DEFICIENCY: ICD-10-CM

## 2021-07-13 RX ORDER — TESTOSTERONE CYPIONATE 200 MG/ML
200 INJECTION, SOLUTION INTRAMUSCULAR
Qty: 4 ML | Refills: 5 | Status: CANCELLED | OUTPATIENT
Start: 2021-07-13

## 2021-07-13 RX ORDER — MELOXICAM 15 MG/1
15 TABLET ORAL DAILY PRN
Qty: 90 TABLET | Refills: 1 | Status: SHIPPED | OUTPATIENT
Start: 2021-07-13

## 2021-07-13 NOTE — TELEPHONE ENCOUNTER
Caller: Robert Harvey    Relationship: Self    Best call back number: [phone]    Medication needed:   Requested Prescriptions     Pending Prescriptions Disp Refills   • meloxicam (MOBIC) 15 MG tablet 30 tablet 3     Sig: Take 1 tablet by mouth Daily As Needed (leg pain).   • Testosterone Cypionate (DEPOTESTOTERONE CYPIONATE) 200 MG/ML injection 4 mL 5     Sig: Inject 1 mL into the appropriate muscle as directed by prescriber Every 7 (Seven) Days.       When do you need the refill by: 07/14    What additional details did the patient provide when requesting the medication:     Does the patient have less than a 3 day supply:  [] Yes  [x] No    What is the patient's preferred pharmacy: [id number] - [place], [place] - [address] [phone] [facility] [phone] FX

## 2021-09-01 ENCOUNTER — TELEPHONE (OUTPATIENT)
Dept: FAMILY MEDICINE CLINIC | Facility: CLINIC | Age: 51
End: 2021-09-01

## 2021-09-01 NOTE — TELEPHONE ENCOUNTER
No we are not able to combine a follow-up visit with a preop visit  Just turn the encounter on November 1 to a preop visit.  Do not have any information at this point what type of surgery he is going to have.  He will need to have all his preop testing done before seeing me on that day.  So all the surgeon ordered labs or EKG or chest x-ray; all has to be done several days before arriving here

## 2021-09-01 NOTE — TELEPHONE ENCOUNTER
leeann Caballero is now scheduled for surgery clearance on 12/6/21 for knee surgery 12/28/21. I did tell him to have all lab and EKG cxr prior to this appointment. He understood. He will also be doing your labs you are wanting at [facility] the week before this appointment. He will call rylie a week before to let her know where he is going for the labs.

## 2021-09-01 NOTE — TELEPHONE ENCOUNTER
Caller: LizstephanieRobert    Relationship: Self    Best call back number: [phone]    What is the medical concern/diagnosis: LEFT KNEE REPLACEMENT    What specialty or service is being requested: ORTHO    What is the provider, practice or medical service name: [facility]    What is the office location: [facility]    What is the office phone number: [phone]    Any additional details: PATIENT IS NOT SURE IF HE NEEDS A SECOND REFERRAL. HIS SURGERY IS SCHEDULED FOR THE 12/28. HE HAS BEEN SEEING HIM ALL YEAR BUT WANTS TO MAKE SURE.

## 2021-09-01 NOTE — TELEPHONE ENCOUNTER
Caller: Robert Harvey    Relationship: Self    Best call back number:[phone]      Who are you requesting to speak with (clinical staff, provider,  specific staff member): NURSE    What was the call regarding: PATIENT IS WANTING TO KNOW IF HE CAN INCLUDE A PRE-OP VISIT FOR LEFT KNEE REPLACEMENT AT HIS FOLLOW UP WITH DR. MURILLO SCHEDULED 11/01 @ 8:45. IT IS NOT CONVENIENT TO HAVE TWO APPOINTMENTS DUE TO WORK AND HE MOVED AND IS NOW FARTHER AWAY.    PATIENT IS ALSO NEEDING TO KNOW IF HE NEEDS LABS DONE FOR THAT VISIT ALSO.     PLEASE CALL AND ADVISE.    Do you require a callback: YES

## 2021-11-15 ENCOUNTER — LAB (OUTPATIENT)
Dept: LAB | Facility: HOSPITAL | Age: 51
End: 2021-11-15

## 2021-11-15 PROCEDURE — 84402 ASSAY OF FREE TESTOSTERONE: CPT | Performed by: FAMILY MEDICINE

## 2021-11-15 PROCEDURE — 84403 ASSAY OF TOTAL TESTOSTERONE: CPT | Performed by: FAMILY MEDICINE

## 2021-11-15 PROCEDURE — 84480 ASSAY TRIIODOTHYRONINE (T3): CPT | Performed by: FAMILY MEDICINE

## 2021-11-15 PROCEDURE — 84153 ASSAY OF PSA TOTAL: CPT | Performed by: FAMILY MEDICINE

## 2021-11-15 PROCEDURE — 84443 ASSAY THYROID STIM HORMONE: CPT | Performed by: FAMILY MEDICINE

## 2021-11-15 PROCEDURE — 83036 HEMOGLOBIN GLYCOSYLATED A1C: CPT | Performed by: FAMILY MEDICINE

## 2021-11-15 PROCEDURE — 82306 VITAMIN D 25 HYDROXY: CPT | Performed by: FAMILY MEDICINE

## 2021-11-15 PROCEDURE — 84439 ASSAY OF FREE THYROXINE: CPT | Performed by: FAMILY MEDICINE

## 2021-11-15 PROCEDURE — 85027 COMPLETE CBC AUTOMATED: CPT | Performed by: FAMILY MEDICINE

## 2021-11-16 ENCOUNTER — TELEPHONE (OUTPATIENT)
Dept: FAMILY MEDICINE CLINIC | Facility: CLINIC | Age: 51
End: 2021-11-16

## 2021-11-16 NOTE — TELEPHONE ENCOUNTER
PATIENT CALLED TO CHECK IF DR. MURILLO HAD REVIEWED THE LAB TEST / EKG FROM [place], FOR HIS SURGICAL CLEARANCE.    PLEASE ADVISE  [phone]

## 2021-12-06 ENCOUNTER — OFFICE VISIT (OUTPATIENT)
Dept: FAMILY MEDICINE CLINIC | Facility: CLINIC | Age: 51
End: 2021-12-06

## 2021-12-06 VITALS
DIASTOLIC BLOOD PRESSURE: 82 MMHG | WEIGHT: 315 LBS | HEART RATE: 77 BPM | BODY MASS INDEX: 37.19 KG/M2 | TEMPERATURE: 97.5 F | SYSTOLIC BLOOD PRESSURE: 148 MMHG | OXYGEN SATURATION: 98 % | HEIGHT: 77 IN

## 2021-12-06 DIAGNOSIS — I15.8 OTHER SECONDARY HYPERTENSION: ICD-10-CM

## 2021-12-06 DIAGNOSIS — E03.9 ACQUIRED HYPOTHYROIDISM: ICD-10-CM

## 2021-12-06 DIAGNOSIS — G89.29 CHRONIC PAIN OF BOTH KNEES: Primary | ICD-10-CM

## 2021-12-06 DIAGNOSIS — Z01.818 PRE-OP EVALUATION: ICD-10-CM

## 2021-12-06 DIAGNOSIS — M25.562 CHRONIC PAIN OF BOTH KNEES: Primary | ICD-10-CM

## 2021-12-06 DIAGNOSIS — M25.561 CHRONIC PAIN OF BOTH KNEES: Primary | ICD-10-CM

## 2021-12-06 DIAGNOSIS — D75.1 SECONDARY ERYTHROCYTOSIS: ICD-10-CM

## 2021-12-06 PROBLEM — I11.9 LVH (LEFT VENTRICULAR HYPERTROPHY) DUE TO HYPERTENSIVE DISEASE, WITHOUT HEART FAILURE: Status: ACTIVE | Noted: 2021-12-06

## 2021-12-06 PROCEDURE — 99214 OFFICE O/P EST MOD 30 MIN: CPT | Performed by: FAMILY MEDICINE

## 2021-12-06 RX ORDER — VALSARTAN 320 MG/1
320 TABLET ORAL DAILY
Qty: 90 TABLET | Refills: 3 | Status: SHIPPED | OUTPATIENT
Start: 2021-12-06 | End: 2023-01-09

## 2021-12-06 RX ORDER — LEVOTHYROXINE AND LIOTHYRONINE 57; 13.5 UG/1; UG/1
90 TABLET ORAL DAILY
Qty: 90 TABLET | Refills: 3 | Status: SHIPPED | OUTPATIENT
Start: 2021-12-06

## 2021-12-06 RX ORDER — MELATONIN
1000 DAILY
COMMUNITY

## 2021-12-12 DIAGNOSIS — E34.9 TESTOSTERONE DEFICIENCY: ICD-10-CM

## 2021-12-13 RX ORDER — TESTOSTERONE CYPIONATE 200 MG/ML
200 INJECTION, SOLUTION INTRAMUSCULAR
Qty: 4 ML | Refills: 5 | Status: SHIPPED | OUTPATIENT
Start: 2021-12-13 | End: 2021-12-15

## 2021-12-15 DIAGNOSIS — E34.9 TESTOSTERONE DEFICIENCY: ICD-10-CM

## 2021-12-15 RX ORDER — TESTOSTERONE CYPIONATE 200 MG/ML
200 INJECTION, SOLUTION INTRAMUSCULAR
Qty: 4 ML | Refills: 5 | Status: SHIPPED | OUTPATIENT
Start: 2021-12-15

## 2022-06-09 NOTE — PROGRESS NOTES
Physical Therapy Daily Progress Note    Visit # : 3  Robert Harvey reports: he was feeling pretty good after last visit.  Pt states he is still having trouble sleeping, but it is better.      Subjective     Objective          Active Range of Motion     Right Knee   Flexion: 100 degrees   Extension: 0 degrees       See Exercise, Manual, and Modality Logs for complete treatment.       Assessment & Plan     Assessment  Assessment details: Pt is progressing fairly well, but c/o knee pain and swelling.  Pt's ROM and strength are improving in flex and strength.  Pt continues to gain more quad control in open and closed chain exercises, but fatigues easily especially during SAQs.  Pt continues to tolerate more CKC strengthening exercises and is ambulating with increased WB using the SPC minimal antalgic gait.  Will continue to see pt 2-3x/week for stretching, strengthening exercises, and modalities PRN for pain.          Progress per Plan of Care           Manual Therapy:   10      mins  89261;  Therapeutic Exercise:    20      mins  75078;     Neuromuscular Velia:        mins  56381;    Therapeutic Activity:   12       mins  98430;     Gait Training:           mins  47233;     Ultrasound:          mins  40212;    Electrical Stimulation:   15      mins  93061 ( );  Dry Needling          mins self-pay    Timed Treatment:  42    mins   Total Treatment:    60    mins    Melani Tamez, PT  Physical Therapist   moderate assist (50% patients effort)

## 2022-07-16 DIAGNOSIS — E34.9 TESTOSTERONE DEFICIENCY: ICD-10-CM

## 2022-07-17 RX ORDER — SYRINGE WITH NEEDLE, 1 ML 25GX5/8"
SYRINGE, EMPTY DISPOSABLE MISCELLANEOUS
Qty: 12 EACH | Refills: 11 | Status: SHIPPED | OUTPATIENT
Start: 2022-07-17

## 2022-08-01 DIAGNOSIS — E34.9 TESTOSTERONE DEFICIENCY: ICD-10-CM

## 2022-08-01 RX ORDER — TESTOSTERONE CYPIONATE 200 MG/ML
INJECTION, SOLUTION INTRAMUSCULAR
Qty: 4 ML | OUTPATIENT
Start: 2022-08-01

## 2023-01-09 DIAGNOSIS — I15.8 OTHER SECONDARY HYPERTENSION: ICD-10-CM

## 2023-01-09 RX ORDER — VALSARTAN 320 MG/1
TABLET ORAL
Qty: 90 TABLET | Refills: 0 | Status: SHIPPED | OUTPATIENT
Start: 2023-01-09